# Patient Record
Sex: MALE | Race: WHITE | NOT HISPANIC OR LATINO
[De-identification: names, ages, dates, MRNs, and addresses within clinical notes are randomized per-mention and may not be internally consistent; named-entity substitution may affect disease eponyms.]

---

## 2017-05-09 ENCOUNTER — NON-APPOINTMENT (OUTPATIENT)
Age: 52
End: 2017-05-09

## 2017-05-09 ENCOUNTER — APPOINTMENT (OUTPATIENT)
Dept: CARDIOLOGY | Facility: CLINIC | Age: 52
End: 2017-05-09

## 2017-05-09 VITALS — BODY MASS INDEX: 29.25 KG/M2 | OXYGEN SATURATION: 99 % | HEIGHT: 66 IN | HEART RATE: 78 BPM | WEIGHT: 182 LBS

## 2017-05-09 VITALS — SYSTOLIC BLOOD PRESSURE: 116 MMHG | DIASTOLIC BLOOD PRESSURE: 74 MMHG | RESPIRATION RATE: 15 BRPM

## 2017-06-15 ENCOUNTER — EMERGENCY (EMERGENCY)
Facility: HOSPITAL | Age: 52
LOS: 1 days | Discharge: HOME | End: 2017-06-15
Attending: INTERNAL MEDICINE
Payer: MEDICARE

## 2017-06-15 VITALS
HEART RATE: 103 BPM | RESPIRATION RATE: 16 BRPM | TEMPERATURE: 101 F | OXYGEN SATURATION: 98 % | DIASTOLIC BLOOD PRESSURE: 68 MMHG | SYSTOLIC BLOOD PRESSURE: 129 MMHG

## 2017-06-15 VITALS
SYSTOLIC BLOOD PRESSURE: 116 MMHG | TEMPERATURE: 101 F | DIASTOLIC BLOOD PRESSURE: 71 MMHG | RESPIRATION RATE: 17 BRPM | HEART RATE: 123 BPM | WEIGHT: 158.07 LBS | OXYGEN SATURATION: 96 % | HEIGHT: 66 IN

## 2017-06-15 LAB
ALBUMIN SERPL ELPH-MCNC: 3.5 G/DL — SIGNIFICANT CHANGE UP (ref 3.3–5)
ALP SERPL-CCNC: 62 U/L — SIGNIFICANT CHANGE UP (ref 30–120)
ALT FLD-CCNC: 31 U/L DA — SIGNIFICANT CHANGE UP (ref 10–60)
ANION GAP SERPL CALC-SCNC: 7 MMOL/L — SIGNIFICANT CHANGE UP (ref 5–17)
APPEARANCE UR: CLEAR — SIGNIFICANT CHANGE UP
APTT BLD: 30.1 SEC — SIGNIFICANT CHANGE UP (ref 27.5–37.4)
AST SERPL-CCNC: 31 U/L — SIGNIFICANT CHANGE UP (ref 10–40)
BASOPHILS # BLD AUTO: 0 K/UL — SIGNIFICANT CHANGE UP (ref 0–0.2)
BASOPHILS NFR BLD AUTO: 0.7 % — SIGNIFICANT CHANGE UP (ref 0–2)
BILIRUB SERPL-MCNC: 0.3 MG/DL — SIGNIFICANT CHANGE UP (ref 0.2–1.2)
BILIRUB UR-MCNC: NEGATIVE — SIGNIFICANT CHANGE UP
BUN SERPL-MCNC: 12 MG/DL — SIGNIFICANT CHANGE UP (ref 7–23)
CALCIUM SERPL-MCNC: 9.6 MG/DL — SIGNIFICANT CHANGE UP (ref 8.4–10.5)
CHLORIDE SERPL-SCNC: 98 MMOL/L — SIGNIFICANT CHANGE UP (ref 96–108)
CO2 SERPL-SCNC: 28 MMOL/L — SIGNIFICANT CHANGE UP (ref 22–31)
COLOR SPEC: YELLOW — SIGNIFICANT CHANGE UP
CREAT SERPL-MCNC: 1.19 MG/DL — SIGNIFICANT CHANGE UP (ref 0.5–1.3)
DIFF PNL FLD: ABNORMAL
EOSINOPHIL # BLD AUTO: 0 K/UL — SIGNIFICANT CHANGE UP (ref 0–0.5)
EOSINOPHIL NFR BLD AUTO: 0.1 % — SIGNIFICANT CHANGE UP (ref 0–6)
GLUCOSE SERPL-MCNC: 125 MG/DL — HIGH (ref 70–99)
GLUCOSE UR QL: NEGATIVE MG/DL — SIGNIFICANT CHANGE UP
HCT VFR BLD CALC: 38.7 % — LOW (ref 39–50)
HGB BLD-MCNC: 13 G/DL — SIGNIFICANT CHANGE UP (ref 13–17)
INR BLD: 1.31 RATIO — HIGH (ref 0.88–1.16)
KETONES UR-MCNC: NEGATIVE — SIGNIFICANT CHANGE UP
LACTATE SERPL-SCNC: 1.5 MMOL/L — SIGNIFICANT CHANGE UP (ref 0.7–2)
LEUKOCYTE ESTERASE UR-ACNC: NEGATIVE — SIGNIFICANT CHANGE UP
LIDOCAIN IGE QN: 193 U/L — SIGNIFICANT CHANGE UP (ref 73–393)
LYMPHOCYTES # BLD AUTO: 0.7 K/UL — LOW (ref 1–3.3)
LYMPHOCYTES # BLD AUTO: 9.4 % — LOW (ref 13–44)
MCHC RBC-ENTMCNC: 30.3 PG — SIGNIFICANT CHANGE UP (ref 27–34)
MCHC RBC-ENTMCNC: 33.5 GM/DL — SIGNIFICANT CHANGE UP (ref 32–36)
MCV RBC AUTO: 90.4 FL — SIGNIFICANT CHANGE UP (ref 80–100)
MONOCYTES # BLD AUTO: 1.1 K/UL — HIGH (ref 0–0.9)
MONOCYTES NFR BLD AUTO: 14.4 % — HIGH (ref 2–14)
NEUTROPHILS # BLD AUTO: 5.5 K/UL — SIGNIFICANT CHANGE UP (ref 1.8–7.4)
NEUTROPHILS NFR BLD AUTO: 75.1 % — SIGNIFICANT CHANGE UP (ref 43–77)
NITRITE UR-MCNC: NEGATIVE — SIGNIFICANT CHANGE UP
PH UR: 6 — SIGNIFICANT CHANGE UP (ref 5–8)
PLATELET # BLD AUTO: 184 K/UL — SIGNIFICANT CHANGE UP (ref 150–400)
POTASSIUM SERPL-MCNC: 3.7 MMOL/L — SIGNIFICANT CHANGE UP (ref 3.5–5.3)
POTASSIUM SERPL-SCNC: 3.7 MMOL/L — SIGNIFICANT CHANGE UP (ref 3.5–5.3)
PROT SERPL-MCNC: 8.2 G/DL — SIGNIFICANT CHANGE UP (ref 6–8.3)
PROT UR-MCNC: 30 MG/DL
PROTHROM AB SERPL-ACNC: 14.4 SEC — HIGH (ref 9.8–12.7)
RBC # BLD: 4.28 M/UL — SIGNIFICANT CHANGE UP (ref 4.2–5.8)
RBC # FLD: 11.8 % — SIGNIFICANT CHANGE UP (ref 10.3–14.5)
SODIUM SERPL-SCNC: 133 MMOL/L — LOW (ref 135–145)
SP GR SPEC: 1.01 — SIGNIFICANT CHANGE UP (ref 1.01–1.02)
UROBILINOGEN FLD QL: NEGATIVE MG/DL — SIGNIFICANT CHANGE UP
WBC # BLD: 7.3 K/UL — SIGNIFICANT CHANGE UP (ref 3.8–10.5)
WBC # FLD AUTO: 7.3 K/UL — SIGNIFICANT CHANGE UP (ref 3.8–10.5)

## 2017-06-15 PROCEDURE — 85027 COMPLETE CBC AUTOMATED: CPT

## 2017-06-15 PROCEDURE — 81001 URINALYSIS AUTO W/SCOPE: CPT

## 2017-06-15 PROCEDURE — 87040 BLOOD CULTURE FOR BACTERIA: CPT

## 2017-06-15 PROCEDURE — 99284 EMERGENCY DEPT VISIT MOD MDM: CPT | Mod: 25

## 2017-06-15 PROCEDURE — 80053 COMPREHEN METABOLIC PANEL: CPT

## 2017-06-15 PROCEDURE — 74020: CPT | Mod: 26

## 2017-06-15 PROCEDURE — 99284 EMERGENCY DEPT VISIT MOD MDM: CPT

## 2017-06-15 PROCEDURE — 85730 THROMBOPLASTIN TIME PARTIAL: CPT

## 2017-06-15 PROCEDURE — 85610 PROTHROMBIN TIME: CPT

## 2017-06-15 PROCEDURE — 87086 URINE CULTURE/COLONY COUNT: CPT

## 2017-06-15 PROCEDURE — 83605 ASSAY OF LACTIC ACID: CPT

## 2017-06-15 PROCEDURE — 83690 ASSAY OF LIPASE: CPT

## 2017-06-15 PROCEDURE — 74020: CPT

## 2017-06-15 RX ORDER — SODIUM CHLORIDE 9 MG/ML
1160 INJECTION INTRAMUSCULAR; INTRAVENOUS; SUBCUTANEOUS ONCE
Qty: 0 | Refills: 0 | Status: COMPLETED | OUTPATIENT
Start: 2017-06-15 | End: 2017-06-15

## 2017-06-15 RX ORDER — SODIUM CHLORIDE 9 MG/ML
1000 INJECTION INTRAMUSCULAR; INTRAVENOUS; SUBCUTANEOUS ONCE
Qty: 0 | Refills: 0 | Status: COMPLETED | OUTPATIENT
Start: 2017-06-15 | End: 2017-06-15

## 2017-06-15 RX ORDER — ACETAMINOPHEN 500 MG
975 TABLET ORAL ONCE
Qty: 0 | Refills: 0 | Status: COMPLETED | OUTPATIENT
Start: 2017-06-15 | End: 2017-06-15

## 2017-06-15 RX ADMIN — SODIUM CHLORIDE 2000 MILLILITER(S): 9 INJECTION INTRAMUSCULAR; INTRAVENOUS; SUBCUTANEOUS at 16:38

## 2017-06-15 RX ADMIN — Medication 975 MILLIGRAM(S): at 17:42

## 2017-06-15 RX ADMIN — SODIUM CHLORIDE 2320 MILLILITER(S): 9 INJECTION INTRAMUSCULAR; INTRAVENOUS; SUBCUTANEOUS at 17:19

## 2017-06-15 NOTE — ED PROVIDER NOTE - OBJECTIVE STATEMENT
3 yo wm with psychomotor retardation,hyperlipidemia who isnt eating since yesterday and noted to have fever in er today. 3 yo wm with psychomotor retardation,hyperlipidemia who isnt eating since yesterday and noted to have fever in er today.unable to get more info.had bm in er and is drinking liquids.

## 2017-06-15 NOTE — ED PROVIDER NOTE - MEDICAL DECISION MAKING DETAILS
fever in er...otherwise nl lab and xr results...no overt source of infection...ate and drank in er without vomiting.

## 2017-06-15 NOTE — ED ADULT NURSE NOTE - OBJECTIVE STATEMENT
Pt presents with aide from [place] [facility] disabilities. Aide states Pt has had decreased appetite and fever. Alert Anxious Nonverbal skin hot and dry to touch color good. Lungs clear. Abdomen distended non tender Positive bowel sounds noted

## 2017-06-15 NOTE — ED ADULT NURSE NOTE - PMH
Anxiety    Autism    Bipolar disorder    Constipation    Hyperlipidemia    Psychomotor retardation    Schizophrenia

## 2017-06-16 LAB
CULTURE RESULTS: NO GROWTH — SIGNIFICANT CHANGE UP
SPECIMEN SOURCE: SIGNIFICANT CHANGE UP

## 2017-06-20 LAB
CULTURE RESULTS: SIGNIFICANT CHANGE UP
CULTURE RESULTS: SIGNIFICANT CHANGE UP
SPECIMEN SOURCE: SIGNIFICANT CHANGE UP
SPECIMEN SOURCE: SIGNIFICANT CHANGE UP

## 2017-08-15 ENCOUNTER — OUTPATIENT (OUTPATIENT)
Dept: OUTPATIENT SERVICES | Facility: HOSPITAL | Age: 52
LOS: 1 days | End: 2017-08-15

## 2017-08-17 DIAGNOSIS — Z01.20 ENCOUNTER FOR DENTAL EXAMINATION AND CLEANING WITHOUT ABNORMAL FINDINGS: ICD-10-CM

## 2018-02-20 ENCOUNTER — OUTPATIENT (OUTPATIENT)
Dept: OUTPATIENT SERVICES | Facility: HOSPITAL | Age: 53
LOS: 1 days | End: 2018-02-20

## 2018-02-26 DIAGNOSIS — Z01.20 ENCOUNTER FOR DENTAL EXAMINATION AND CLEANING WITHOUT ABNORMAL FINDINGS: ICD-10-CM

## 2018-05-22 ENCOUNTER — NON-APPOINTMENT (OUTPATIENT)
Age: 53
End: 2018-05-22

## 2018-05-22 ENCOUNTER — APPOINTMENT (OUTPATIENT)
Dept: CARDIOLOGY | Facility: CLINIC | Age: 53
End: 2018-05-22
Payer: MEDICARE

## 2018-05-22 VITALS — OXYGEN SATURATION: 98 % | HEART RATE: 77 BPM

## 2018-05-22 VITALS — WEIGHT: 175 LBS | HEIGHT: 66 IN | BODY MASS INDEX: 28.12 KG/M2

## 2018-05-22 PROCEDURE — 93000 ELECTROCARDIOGRAM COMPLETE: CPT

## 2018-05-22 PROCEDURE — 99214 OFFICE O/P EST MOD 30 MIN: CPT | Mod: 25

## 2018-05-22 RX ORDER — PSYLLIUM SEED
PACKET (EA) ORAL
Refills: 0 | Status: ACTIVE | COMMUNITY

## 2018-05-22 RX ORDER — ASPIRIN 81 MG
600-400 TABLET, DELAYED RELEASE (ENTERIC COATED) ORAL TWICE DAILY
Refills: 0 | Status: ACTIVE | COMMUNITY

## 2018-08-28 ENCOUNTER — OUTPATIENT (OUTPATIENT)
Dept: OUTPATIENT SERVICES | Facility: HOSPITAL | Age: 53
LOS: 1 days | End: 2018-08-28

## 2018-09-06 DIAGNOSIS — Z01.20 ENCOUNTER FOR DENTAL EXAMINATION AND CLEANING WITHOUT ABNORMAL FINDINGS: ICD-10-CM

## 2018-09-06 PROBLEM — F45.9 SOMATOFORM DISORDER, UNSPECIFIED: Chronic | Status: ACTIVE | Noted: 2017-06-15

## 2018-09-06 PROBLEM — F41.9 ANXIETY DISORDER, UNSPECIFIED: Chronic | Status: ACTIVE | Noted: 2017-06-15

## 2018-09-06 PROBLEM — F84.0 AUTISTIC DISORDER: Chronic | Status: ACTIVE | Noted: 2017-06-15

## 2018-09-06 PROBLEM — F20.9 SCHIZOPHRENIA, UNSPECIFIED: Chronic | Status: ACTIVE | Noted: 2017-06-15

## 2018-09-06 PROBLEM — E78.5 HYPERLIPIDEMIA, UNSPECIFIED: Chronic | Status: ACTIVE | Noted: 2017-06-15

## 2018-09-06 PROBLEM — K59.00 CONSTIPATION, UNSPECIFIED: Chronic | Status: ACTIVE | Noted: 2017-06-15

## 2019-03-13 ENCOUNTER — OUTPATIENT (OUTPATIENT)
Dept: OUTPATIENT SERVICES | Facility: HOSPITAL | Age: 54
LOS: 1 days | End: 2019-03-13
Payer: MEDICARE

## 2019-03-13 VITALS
OXYGEN SATURATION: 98 % | DIASTOLIC BLOOD PRESSURE: 75 MMHG | HEIGHT: 68 IN | HEART RATE: 90 BPM | SYSTOLIC BLOOD PRESSURE: 113 MMHG | TEMPERATURE: 98 F | WEIGHT: 171.96 LBS | RESPIRATION RATE: 18 BRPM

## 2019-03-13 DIAGNOSIS — Z98.890 OTHER SPECIFIED POSTPROCEDURAL STATES: Chronic | ICD-10-CM

## 2019-03-13 DIAGNOSIS — E78.5 HYPERLIPIDEMIA, UNSPECIFIED: ICD-10-CM

## 2019-03-13 DIAGNOSIS — K02.9 DENTAL CARIES, UNSPECIFIED: ICD-10-CM

## 2019-03-13 DIAGNOSIS — Z87.39 PERSONAL HISTORY OF OTHER DISEASES OF THE MUSCULOSKELETAL SYSTEM AND CONNECTIVE TISSUE: ICD-10-CM

## 2019-03-13 DIAGNOSIS — Z01.818 ENCOUNTER FOR OTHER PREPROCEDURAL EXAMINATION: ICD-10-CM

## 2019-03-13 LAB
ANION GAP SERPL CALC-SCNC: 10 MMOL/L — SIGNIFICANT CHANGE UP (ref 5–17)
BUN SERPL-MCNC: 18 MG/DL — SIGNIFICANT CHANGE UP (ref 7–23)
CALCIUM SERPL-MCNC: 9.7 MG/DL — SIGNIFICANT CHANGE UP (ref 8.4–10.5)
CHLORIDE SERPL-SCNC: 102 MMOL/L — SIGNIFICANT CHANGE UP (ref 96–108)
CO2 SERPL-SCNC: 24 MMOL/L — SIGNIFICANT CHANGE UP (ref 22–31)
CREAT SERPL-MCNC: 1.11 MG/DL — SIGNIFICANT CHANGE UP (ref 0.5–1.3)
GLUCOSE SERPL-MCNC: 96 MG/DL — SIGNIFICANT CHANGE UP (ref 70–99)
HCT VFR BLD CALC: 40.2 % — SIGNIFICANT CHANGE UP (ref 39–50)
HGB BLD-MCNC: 13.5 G/DL — SIGNIFICANT CHANGE UP (ref 13–17)
MCHC RBC-ENTMCNC: 29.8 PG — SIGNIFICANT CHANGE UP (ref 27–34)
MCHC RBC-ENTMCNC: 33.6 GM/DL — SIGNIFICANT CHANGE UP (ref 32–36)
MCV RBC AUTO: 88.7 FL — SIGNIFICANT CHANGE UP (ref 80–100)
PLATELET # BLD AUTO: 272 K/UL — SIGNIFICANT CHANGE UP (ref 150–400)
POTASSIUM SERPL-MCNC: 4.1 MMOL/L — SIGNIFICANT CHANGE UP (ref 3.5–5.3)
POTASSIUM SERPL-SCNC: 4.1 MMOL/L — SIGNIFICANT CHANGE UP (ref 3.5–5.3)
RBC # BLD: 4.53 M/UL — SIGNIFICANT CHANGE UP (ref 4.2–5.8)
RBC # FLD: 13.4 % — SIGNIFICANT CHANGE UP (ref 10.3–14.5)
SODIUM SERPL-SCNC: 136 MMOL/L — SIGNIFICANT CHANGE UP (ref 135–145)
WBC # BLD: 5.87 K/UL — SIGNIFICANT CHANGE UP (ref 3.8–10.5)
WBC # FLD AUTO: 5.87 K/UL — SIGNIFICANT CHANGE UP (ref 3.8–10.5)

## 2019-03-13 PROCEDURE — 71045 X-RAY EXAM CHEST 1 VIEW: CPT

## 2019-03-13 PROCEDURE — G0463: CPT

## 2019-03-13 PROCEDURE — 71045 X-RAY EXAM CHEST 1 VIEW: CPT | Mod: 26

## 2019-03-13 PROCEDURE — 85027 COMPLETE CBC AUTOMATED: CPT

## 2019-03-13 PROCEDURE — 80048 BASIC METABOLIC PNL TOTAL CA: CPT

## 2019-03-13 RX ORDER — DOCUSATE SODIUM 100 MG
0 CAPSULE ORAL
Qty: 0 | Refills: 0 | COMMUNITY

## 2019-03-13 RX ORDER — LIDOCAINE HCL 20 MG/ML
0.2 VIAL (ML) INJECTION ONCE
Qty: 0 | Refills: 0 | Status: DISCONTINUED | OUTPATIENT
Start: 2019-03-20 | End: 2019-04-04

## 2019-03-13 RX ORDER — SODIUM CHLORIDE 9 MG/ML
3 INJECTION INTRAMUSCULAR; INTRAVENOUS; SUBCUTANEOUS EVERY 8 HOURS
Qty: 0 | Refills: 0 | Status: DISCONTINUED | OUTPATIENT
Start: 2019-03-20 | End: 2019-04-04

## 2019-03-13 NOTE — H&P PST ADULT - NSICDXPASTMEDICALHX_GEN_ALL_CORE_FT
PAST MEDICAL HISTORY:  Anxiety     Autism     Constipation     Hyperlipidemia     Incomplete RBBB     Osteoporosis     Psychomotor retardation     Schizophrenia

## 2019-03-13 NOTE — H&P PST ADULT - NSICDXPROBLEM_GEN_ALL_CORE_FT
PROBLEM DIAGNOSES  Problem: Dental caries  Assessment and Plan: multiple extractions and restorations    Problem: Hyperlipidemia  Assessment and Plan: continue statin    Problem: History of osteoporosis  Assessment and Plan: continue med

## 2019-03-13 NOTE — H&P PST ADULT - HISTORY OF PRESENT ILLNESS
This is a  54 y/o male with history of autistic disorder, mental retardation, hyperlipidemia, osteoporosis, c/o dental caries, he presents today for multiple extractions and restorations This is a  54 y/o male with history of autistic disorder, mental retardation, hyperlipidemia, osteoporosis, c/o dental caries, he presents today for multiple extractions and restorations.  ***pt lives in group home, history obtained from MD notes and group home staff,  treatment consent telephone  obtained from brother Paul Garay  [phone]***

## 2019-03-14 PROBLEM — F31.9 BIPOLAR DISORDER, UNSPECIFIED: Chronic | Status: INACTIVE | Noted: 2017-06-15 | Resolved: 2019-03-13

## 2019-03-19 ENCOUNTER — TRANSCRIPTION ENCOUNTER (OUTPATIENT)
Age: 54
End: 2019-03-19

## 2019-03-20 ENCOUNTER — OUTPATIENT (OUTPATIENT)
Dept: OUTPATIENT SERVICES | Facility: HOSPITAL | Age: 54
LOS: 1 days | End: 2019-03-20
Payer: MEDICARE

## 2019-03-20 VITALS
DIASTOLIC BLOOD PRESSURE: 70 MMHG | HEART RATE: 77 BPM | OXYGEN SATURATION: 99 % | SYSTOLIC BLOOD PRESSURE: 112 MMHG | RESPIRATION RATE: 15 BRPM

## 2019-03-20 VITALS
HEART RATE: 90 BPM | OXYGEN SATURATION: 98 % | SYSTOLIC BLOOD PRESSURE: 138 MMHG | TEMPERATURE: 98 F | RESPIRATION RATE: 18 BRPM | WEIGHT: 171.96 LBS | HEIGHT: 68 IN | DIASTOLIC BLOOD PRESSURE: 757 MMHG

## 2019-03-20 DIAGNOSIS — K02.9 DENTAL CARIES, UNSPECIFIED: ICD-10-CM

## 2019-03-20 DIAGNOSIS — Z01.818 ENCOUNTER FOR OTHER PREPROCEDURAL EXAMINATION: ICD-10-CM

## 2019-03-20 PROCEDURE — D1110: CPT

## 2019-03-20 PROCEDURE — D2140: CPT

## 2019-03-20 PROCEDURE — D0180: CPT

## 2019-03-20 PROCEDURE — D2160: CPT

## 2019-03-20 RX ORDER — SODIUM CHLORIDE 9 MG/ML
1000 INJECTION, SOLUTION INTRAVENOUS
Qty: 0 | Refills: 0 | Status: DISCONTINUED | OUTPATIENT
Start: 2019-03-20 | End: 2019-04-04

## 2019-03-20 RX ORDER — ONDANSETRON 8 MG/1
4 TABLET, FILM COATED ORAL ONCE
Qty: 0 | Refills: 0 | Status: DISCONTINUED | OUTPATIENT
Start: 2019-03-20 | End: 2019-04-04

## 2019-03-20 NOTE — ASU DISCHARGE PLAN (ADULT/PEDIATRIC) - COMMENTS
Follow up with [facility] or [facility] in 6 months for periodic oral exam and adult prophylaxis

## 2019-03-20 NOTE — ASU DISCHARGE PLAN (ADULT/PEDIATRIC) - PROCEDURE
comprehensive dental exam under general anesthesia, adult prophylaxis with localized scaling and root planing

## 2019-03-20 NOTE — BRIEF OPERATIVE NOTE - OPERATION/FINDINGS
Permanent dentition, grossly intact with multiple missing teeth. Existing amalgam restorations intact. Generalized moderate to severe chronic periodontitis with gingival recession and supra and subgingival calculus accretions.

## 2019-03-20 NOTE — ASU DISCHARGE PLAN (ADULT/PEDIATRIC) - CALL YOUR DOCTOR IF YOU HAVE ANY OF THE FOLLOWING:
Pain not relieved by Medications/Swelling that gets worse/Bleeding that does not stop/Inability to tolerate liquids or foods/Nausea and vomiting that does not stop/Increased irritability or sluggishness

## 2019-03-20 NOTE — ASU DISCHARGE PLAN (ADULT/PEDIATRIC) - CARE PROVIDER_API CALL
Melvin Johnson (DDS; MPH)  Dental Medicine  [address] [place] [address]  Phone: [phone]  Fax: [phone]  Follow Up Time:

## 2019-06-14 ENCOUNTER — APPOINTMENT (OUTPATIENT)
Dept: CARDIOLOGY | Facility: CLINIC | Age: 54
End: 2019-06-14
Payer: MEDICARE

## 2019-06-14 ENCOUNTER — NON-APPOINTMENT (OUTPATIENT)
Age: 54
End: 2019-06-14

## 2019-06-14 VITALS
WEIGHT: 174 LBS | DIASTOLIC BLOOD PRESSURE: 75 MMHG | BODY MASS INDEX: 27.97 KG/M2 | HEART RATE: 71 BPM | SYSTOLIC BLOOD PRESSURE: 112 MMHG | HEIGHT: 66 IN | OXYGEN SATURATION: 100 %

## 2019-06-14 PROBLEM — M81.0 AGE-RELATED OSTEOPOROSIS WITHOUT CURRENT PATHOLOGICAL FRACTURE: Chronic | Status: ACTIVE | Noted: 2019-03-13

## 2019-06-14 PROBLEM — I45.10 UNSPECIFIED RIGHT BUNDLE-BRANCH BLOCK: Chronic | Status: ACTIVE | Noted: 2019-03-13

## 2019-06-14 PROCEDURE — 99214 OFFICE O/P EST MOD 30 MIN: CPT | Mod: 25

## 2019-06-14 PROCEDURE — 93000 ELECTROCARDIOGRAM COMPLETE: CPT

## 2019-06-14 NOTE — DISCUSSION/SUMMARY
[FreeTextEntry1] : \par \par Abnormal electrocardiogram - incomplete right bundle branch block. Similar to the past.Cardiac point of view stable\par \par Hyperlipidemia - Continue taking Tricor. Recent labs reviewed  Follow up with PCP  for blood works. \par \par  routine followup  in one year or sooner p.r.n.

## 2019-06-14 NOTE — HISTORY OF PRESENT ILLNESS
[FreeTextEntry1] : Mr. Garay 54 year old male mentally retarded  presents for routine annual cardiology followup was to a history of abnormal electrocardiogram.\par \par The patient is nonverbal.\par \par  As per the aide who accompanies  him,states that  he has  not  demonstrated  symptoms of shortness of breath or chest pain. There is no evidence of edema. He has had no syncope.\par \par Recent blood works done at [facility]. cholesterol levels wnl.\par Blood pressure  today 112/75\par \par \par \par

## 2019-06-14 NOTE — REVIEW OF SYSTEMS
[see HPI] : see HPI [Anxiety] : anxiety [Negative] : Cardiovascular [Eyeglasses] : not currently wearing eyeglasses [Abdominal Pain] : no abdominal pain

## 2020-06-18 ENCOUNTER — APPOINTMENT (OUTPATIENT)
Dept: CARDIOLOGY | Facility: CLINIC | Age: 55
End: 2020-06-18
Payer: MEDICARE

## 2020-06-18 PROCEDURE — 99214 OFFICE O/P EST MOD 30 MIN: CPT

## 2020-06-18 NOTE — PHYSICAL EXAM
[General Appearance - Well Nourished] : well nourished [Well Groomed] : well groomed [General Appearance - Well Developed] : well developed [General Appearance - In No Acute Distress] : no acute distress [Normal Conjunctiva] : the conjunctiva exhibited no abnormalities [Eyelids - No Xanthelasma] : the eyelids demonstrated no xanthelasmas [No Oral Pallor] : no oral pallor [No Oral Cyanosis] : no oral cyanosis [Respiration, Rhythm And Depth] : normal respiratory rhythm and effort [Exaggerated Use Of Accessory Muscles For Inspiration] : no accessory muscle use [Auscultation Breath Sounds / Voice Sounds] : lungs were clear to auscultation bilaterally [Heart Rate And Rhythm] : heart rate and rhythm were normal [Arterial Pulses Normal] : the arterial pulses were normal [Heart Sounds] : normal S1 and S2 [Murmurs] : no murmurs present [Bowel Sounds] : normal bowel sounds [Edema] : no peripheral edema present [Abdomen Soft] : soft [Abdomen Tenderness] : non-tender [Nail Clubbing] : no clubbing of the fingernails [Cyanosis, Localized] : no localized cyanosis [Skin Color & Pigmentation] : normal skin color and pigmentation [] : no rash [No Venous Stasis] : no venous stasis [FreeTextEntry1] : mentally retarded. Anxious. Nonverbal.

## 2020-06-18 NOTE — DISCUSSION/SUMMARY
[FreeTextEntry1] : \par \par Abnormal electrocardiogram - incomplete right bundle branch block. Similar to the past.Cardiac point of view stable\par \par Hyperlipidemia - not well controlled,  Continue taking Tricor. will fish oil 2 tabs daily  Follow up with PCP  for blood works. \par \par  routine followup  in one year or sooner p.r.n.

## 2020-06-18 NOTE — HISTORY OF PRESENT ILLNESS
[FreeTextEntry1] : Mr. Garay 55 year old male mentally retarded  presents for routine annual cardiology followup was to a history of abnormal electrocardiogram.\par \par The patient is nonverbal. restless non cooperative \par \par  As per the aide who accompanies  him,states that  he has  not  demonstrated  symptoms of shortness of breath or chest pain. There is no evidence of edema. He has had no syncope.\par \par Recent blood works done at [facility]. lipid profile 12/19     HDL33 [id number]\par Blood pressure  readings at group home normal range \par \par \par \par

## 2020-06-18 NOTE — REVIEW OF SYSTEMS
[Eyeglasses] : not currently wearing eyeglasses [Abdominal Pain] : no abdominal pain [see HPI] : see HPI [Anxiety] : anxiety [Negative] : Cardiovascular

## 2021-02-09 RX ORDER — OMEGA-3/DHA/EPA/FISH OIL 300-1000MG
1000 CAPSULE ORAL DAILY
Qty: 180 | Refills: 1 | Status: ACTIVE | COMMUNITY
Start: 2020-06-18 | End: 1900-01-01

## 2021-05-03 ENCOUNTER — OUTPATIENT (OUTPATIENT)
Dept: OUTPATIENT SERVICES | Facility: HOSPITAL | Age: 56
LOS: 1 days | End: 2021-05-03
Payer: MEDICARE

## 2021-05-03 DIAGNOSIS — Z11.52 ENCOUNTER FOR SCREENING FOR COVID-19: ICD-10-CM

## 2021-05-03 LAB — SARS-COV-2 RNA SPEC QL NAA+PROBE: SIGNIFICANT CHANGE UP

## 2021-05-03 PROCEDURE — U0003: CPT

## 2021-05-03 PROCEDURE — C9803: CPT

## 2021-05-03 PROCEDURE — U0005: CPT

## 2021-05-06 ENCOUNTER — OUTPATIENT (OUTPATIENT)
Dept: OUTPATIENT SERVICES | Facility: HOSPITAL | Age: 56
LOS: 1 days | End: 2021-05-06
Payer: MEDICARE

## 2021-05-06 VITALS
RESPIRATION RATE: 16 BRPM | HEART RATE: 90 BPM | WEIGHT: 171.96 LBS | DIASTOLIC BLOOD PRESSURE: 83 MMHG | SYSTOLIC BLOOD PRESSURE: 132 MMHG | TEMPERATURE: 98 F | HEIGHT: 66 IN | OXYGEN SATURATION: 97 %

## 2021-05-06 DIAGNOSIS — K02.62 DENTAL CARIES ON SMOOTH SURFACE PENETRATING INTO DENTIN: ICD-10-CM

## 2021-05-06 DIAGNOSIS — Z01.818 ENCOUNTER FOR OTHER PREPROCEDURAL EXAMINATION: ICD-10-CM

## 2021-05-06 DIAGNOSIS — K02.9 DENTAL CARIES, UNSPECIFIED: ICD-10-CM

## 2021-05-06 DIAGNOSIS — F41.9 ANXIETY DISORDER, UNSPECIFIED: ICD-10-CM

## 2021-05-06 DIAGNOSIS — K05.6 PERIODONTAL DISEASE, UNSPECIFIED: ICD-10-CM

## 2021-05-06 DIAGNOSIS — Z92.89 PERSONAL HISTORY OF OTHER MEDICAL TREATMENT: Chronic | ICD-10-CM

## 2021-05-06 LAB
ANION GAP SERPL CALC-SCNC: 14 MMOL/L — SIGNIFICANT CHANGE UP (ref 5–17)
BUN SERPL-MCNC: 16 MG/DL — SIGNIFICANT CHANGE UP (ref 7–23)
CALCIUM SERPL-MCNC: 9.4 MG/DL — SIGNIFICANT CHANGE UP (ref 8.4–10.5)
CHLORIDE SERPL-SCNC: 101 MMOL/L — SIGNIFICANT CHANGE UP (ref 96–108)
CO2 SERPL-SCNC: 23 MMOL/L — SIGNIFICANT CHANGE UP (ref 22–31)
CREAT SERPL-MCNC: 1.02 MG/DL — SIGNIFICANT CHANGE UP (ref 0.5–1.3)
GLUCOSE SERPL-MCNC: 69 MG/DL — LOW (ref 70–99)
HCT VFR BLD CALC: 37.9 % — LOW (ref 39–50)
HGB BLD-MCNC: 13.1 G/DL — SIGNIFICANT CHANGE UP (ref 13–17)
MCHC RBC-ENTMCNC: 30.5 PG — SIGNIFICANT CHANGE UP (ref 27–34)
MCHC RBC-ENTMCNC: 34.6 GM/DL — SIGNIFICANT CHANGE UP (ref 32–36)
MCV RBC AUTO: 88.3 FL — SIGNIFICANT CHANGE UP (ref 80–100)
NRBC # BLD: 0 /100 WBCS — SIGNIFICANT CHANGE UP (ref 0–0)
PLATELET # BLD AUTO: 309 K/UL — SIGNIFICANT CHANGE UP (ref 150–400)
POTASSIUM SERPL-MCNC: 3.6 MMOL/L — SIGNIFICANT CHANGE UP (ref 3.5–5.3)
POTASSIUM SERPL-SCNC: 3.6 MMOL/L — SIGNIFICANT CHANGE UP (ref 3.5–5.3)
RBC # BLD: 4.29 M/UL — SIGNIFICANT CHANGE UP (ref 4.2–5.8)
RBC # FLD: 12.4 % — SIGNIFICANT CHANGE UP (ref 10.3–14.5)
SODIUM SERPL-SCNC: 138 MMOL/L — SIGNIFICANT CHANGE UP (ref 135–145)
WBC # BLD: 6.46 K/UL — SIGNIFICANT CHANGE UP (ref 3.8–10.5)
WBC # FLD AUTO: 6.46 K/UL — SIGNIFICANT CHANGE UP (ref 3.8–10.5)

## 2021-05-06 PROCEDURE — 85027 COMPLETE CBC AUTOMATED: CPT

## 2021-05-06 PROCEDURE — G0463: CPT

## 2021-05-06 PROCEDURE — 80048 BASIC METABOLIC PNL TOTAL CA: CPT

## 2021-05-06 RX ORDER — RISPERIDONE 4 MG/1
1 TABLET ORAL
Qty: 0 | Refills: 0 | DISCHARGE

## 2021-05-06 RX ORDER — ZOLPIDEM TARTRATE 10 MG/1
0 TABLET ORAL
Qty: 0 | Refills: 0 | DISCHARGE

## 2021-05-06 RX ORDER — THIORIDAZINE HCL 10 MG
0 TABLET ORAL
Qty: 0 | Refills: 0 | DISCHARGE

## 2021-05-06 RX ORDER — FENOFIBRATE,MICRONIZED 130 MG
1 CAPSULE ORAL
Qty: 0 | Refills: 0 | DISCHARGE

## 2021-05-06 NOTE — H&P PST ADULT - HISTORY OF PRESENT ILLNESS
This is a  52 y/o male with history of autistic disorder, mental retardation, hyperlipidemia, osteoporosis, c/o dental caries, he presents today for multiple extractions and restorations.  ***pt lives in group home, history obtained from MD notes and group home staff,  treatment consent telephone  obtained from brother Paul Garay  [phone]*** History obtained from chart and group home staff. Guardians are mother Alejandrina Garay , father Paul Garay   57 yo male PMH Intellectual disability, autism, schizophrenia, BPH, hyperlipidemia. Pt. returns to [facility] today for Comprehensive Dental Treatment under anesthesia on 5/19. Pt. is non-verbal, bursts of yelling at times, constantly drinking water and urinating in the bathroom. Pt. does not wear a mask while in the hospital, tested - for COVID-19 on 5/3/21. Aleksey, group home staff, denies pt. had COVID-19 infection in 2020/2021, denies close contact with anyone that has been ill, no fever, cough, dyspnea in past two weeks.     Pt. is scheduled for COVID-19 testing on 5/17 at [facility]    Pt. will have pre-op  M/E at [facility] on 5/11

## 2021-05-06 NOTE — H&P PST ADULT - MUSCULOSKELETAL
negative No joint pain, swelling or deformity; no limitation of movement normal strength detailed exam

## 2021-05-06 NOTE — H&P PST ADULT - NSICDXPASTSURGICALHX_GEN_ALL_CORE_FT
No significant past surgical history PAST SURGICAL HISTORY:  History of dental surgery Comprenhensive Dental Treatment under general anesthesia on  2011, 2019 at [facility]

## 2021-05-06 NOTE — H&P PST ADULT - CARDIOVASCULAR
Regular rate & rhythm, normal S1, S2; no murmurs, gallops or rubs; no S3, S4 knowledge deficit/ineffective breastfeeding negative details… detailed exam

## 2021-05-06 NOTE — H&P PST ADULT - ASSIST WITH
I have reviewed and confirmed nurses' notes for patient's medications, allergies, medical history, and surgical history. standing

## 2021-05-06 NOTE — H&P PST ADULT - NSICDXPASTMEDICALHX_GEN_ALL_CORE_FT
PAST MEDICAL HISTORY:  Anxiety     Autism     Constipation     Hyperlipidemia     Incomplete RBBB     Osteoporosis     Psychomotor retardation     Schizophrenia PAST MEDICAL HISTORY:  Anxiety     Autism     BPH (benign prostatic hyperplasia)     Constipation     Hyperlipidemia     Incomplete RBBB     Osteoporosis     Psychomotor retardation     Schizophrenia

## 2021-05-06 NOTE — H&P PST ADULT - NSICDXPROBLEM_GEN_ALL_CORE_FT
PROBLEM DIAGNOSES  Problem: Dental caries  Assessment and Plan: Comprenhensive Dental Treatment Under Anesthesia on 5/19    Problem: Anxiety  Assessment and Plan: Continue antianxiety medications as indicated with sip of water

## 2021-05-12 PROBLEM — N40.0 BENIGN PROSTATIC HYPERPLASIA WITHOUT LOWER URINARY TRACT SYMPTOMS: Chronic | Status: ACTIVE | Noted: 2021-05-06

## 2021-05-17 ENCOUNTER — OUTPATIENT (OUTPATIENT)
Dept: OUTPATIENT SERVICES | Facility: HOSPITAL | Age: 56
LOS: 1 days | End: 2021-05-17
Payer: MEDICARE

## 2021-05-17 DIAGNOSIS — Z11.52 ENCOUNTER FOR SCREENING FOR COVID-19: ICD-10-CM

## 2021-05-17 DIAGNOSIS — Z92.89 PERSONAL HISTORY OF OTHER MEDICAL TREATMENT: Chronic | ICD-10-CM

## 2021-05-17 LAB — SARS-COV-2 RNA SPEC QL NAA+PROBE: SIGNIFICANT CHANGE UP

## 2021-05-17 PROCEDURE — U0005: CPT

## 2021-05-17 PROCEDURE — U0003: CPT

## 2021-05-17 PROCEDURE — C9803: CPT

## 2021-05-18 ENCOUNTER — TRANSCRIPTION ENCOUNTER (OUTPATIENT)
Age: 56
End: 2021-05-18

## 2021-05-18 NOTE — ASU DISCHARGE PLAN (ADULT/PEDIATRIC) - ASU DC SPECIAL INSTRUCTIONSFT
comprehensive dental treatment under general anesthesia    tylenol prn pain     see Dr Graff in one week at [facility] [id number]    return to program on Friday and resume all medications

## 2021-05-19 ENCOUNTER — OUTPATIENT (OUTPATIENT)
Dept: OUTPATIENT SERVICES | Facility: HOSPITAL | Age: 56
LOS: 1 days | End: 2021-05-19
Payer: MEDICARE

## 2021-05-19 ENCOUNTER — TRANSCRIPTION ENCOUNTER (OUTPATIENT)
Age: 56
End: 2021-05-19

## 2021-05-19 VITALS
HEART RATE: 62 BPM | RESPIRATION RATE: 16 BRPM | DIASTOLIC BLOOD PRESSURE: 74 MMHG | SYSTOLIC BLOOD PRESSURE: 112 MMHG | OXYGEN SATURATION: 96 %

## 2021-05-19 VITALS
DIASTOLIC BLOOD PRESSURE: 78 MMHG | RESPIRATION RATE: 18 BRPM | OXYGEN SATURATION: 100 % | SYSTOLIC BLOOD PRESSURE: 115 MMHG | TEMPERATURE: 97 F | HEIGHT: 66 IN | HEART RATE: 76 BPM | WEIGHT: 171.96 LBS

## 2021-05-19 DIAGNOSIS — Z92.89 PERSONAL HISTORY OF OTHER MEDICAL TREATMENT: Chronic | ICD-10-CM

## 2021-05-19 DIAGNOSIS — K05.6 PERIODONTAL DISEASE, UNSPECIFIED: ICD-10-CM

## 2021-05-19 DIAGNOSIS — K02.62 DENTAL CARIES ON SMOOTH SURFACE PENETRATING INTO DENTIN: ICD-10-CM

## 2021-05-19 PROCEDURE — D1110: CPT

## 2021-05-19 PROCEDURE — D4341: CPT

## 2021-05-19 PROCEDURE — 41820 EXCISION GUM EACH QUADRANT: CPT

## 2021-05-19 RX ORDER — ACETAMINOPHEN 500 MG
1000 TABLET ORAL ONCE
Refills: 0 | Status: DISCONTINUED | OUTPATIENT
Start: 2021-05-19 | End: 2021-05-19

## 2021-05-19 RX ORDER — SODIUM CHLORIDE 9 MG/ML
3 INJECTION INTRAMUSCULAR; INTRAVENOUS; SUBCUTANEOUS EVERY 8 HOURS
Refills: 0 | Status: DISCONTINUED | OUTPATIENT
Start: 2021-05-19 | End: 2021-05-19

## 2021-05-19 NOTE — DISCHARGE NOTE NURSING/CASE MANAGEMENT/SOCIAL WORK - PATIENT PORTAL LINK FT
You can access the FollowMyHealth Patient Portal offered by [facility] by registering at the following website: [email or web address]. By joining [facility]’s FollowMyHealth portal, you will also be able to view your health information using other applications (apps) compatible with our system.

## 2021-06-17 ENCOUNTER — APPOINTMENT (OUTPATIENT)
Dept: CARDIOLOGY | Facility: CLINIC | Age: 56
End: 2021-06-17
Payer: MEDICARE

## 2021-06-17 VITALS
HEIGHT: 66 IN | OXYGEN SATURATION: 100 % | HEART RATE: 74 BPM | SYSTOLIC BLOOD PRESSURE: 116 MMHG | DIASTOLIC BLOOD PRESSURE: 72 MMHG | WEIGHT: 175 LBS | BODY MASS INDEX: 28.12 KG/M2

## 2021-06-17 PROCEDURE — 99214 OFFICE O/P EST MOD 30 MIN: CPT

## 2021-06-17 RX ORDER — QUETIAPINE FUMARATE 50 MG/1
50 TABLET ORAL TWICE DAILY
Refills: 0 | Status: ACTIVE | COMMUNITY

## 2021-06-17 NOTE — HISTORY OF PRESENT ILLNESS
[FreeTextEntry1] : 56 year old male with PMH: mental retardation who presents to the office today for routine annual cardiology follow up due to a history of abnormal EKG.\par \par The patient is accompanied by his aide and is nonverbal, restless and uncooperative.\par \par As per the aide, he states that patient has not demonstrated symptoms of shortness of breath, or chest pain.  There is no evidence of edema and he has had no syncope.  He has a great appetite.\par \par Last labs we have from 2019 demonstrate total cholesterol 168, triglycerides 151, HDL 34, .\par \par Patient would not cooperate for EKG today.

## 2021-06-17 NOTE — DISCUSSION/SUMMARY
[FreeTextEntry1] : Abnormal electrocardiogram - unable to obtain EKG today as patient was too restless.  Has offered no complaints and does not appear to be in any distress.\par \par Hyperlipidemia - Currently taking fish oil and tricor.  No recent labs.  Labs ordered.\par \par Follow up in one year or sooner if needed.

## 2021-06-17 NOTE — REASON FOR VISIT
[Arrhythmia/ECG Abnorrmalities] : arrhythmia/ECG abnormalities [Follow-Up - Clinic] : a clinic follow-up of [FreeTextEntry2] : abnormal EKG.

## 2021-06-17 NOTE — REVIEW OF SYSTEMS
[SOB] : no shortness of breath [Dyspnea on exertion] : not dyspnea during exertion [Chest Discomfort] : no chest discomfort [Lower Ext Edema] : no extremity edema [Orthopnea] : no orthopnea [PND] : no PND [Syncope] : no syncope [Rash] : no rash [de-identified] : Patient with history of  autism

## 2021-06-17 NOTE — PHYSICAL EXAM
[General Appearance - Well Developed] : well developed [Well Groomed] : well groomed [General Appearance - Well Nourished] : well nourished [General Appearance - In No Acute Distress] : no acute distress [Normal Conjunctiva] : the conjunctiva exhibited no abnormalities [Eyelids - No Xanthelasma] : the eyelids demonstrated no xanthelasmas [No Oral Pallor] : no oral pallor [No Oral Cyanosis] : no oral cyanosis [Respiration, Rhythm And Depth] : normal respiratory rhythm and effort [Exaggerated Use Of Accessory Muscles For Inspiration] : no accessory muscle use [Auscultation Breath Sounds / Voice Sounds] : lungs were clear to auscultation bilaterally [Heart Rate And Rhythm] : heart rate and rhythm were normal [Heart Sounds] : normal S1 and S2 [Murmurs] : no murmurs present [Arterial Pulses Normal] : the arterial pulses were normal [Edema] : no peripheral edema present [Bowel Sounds] : normal bowel sounds [Abdomen Soft] : soft [Abdomen Tenderness] : non-tender [Nail Clubbing] : no clubbing of the fingernails [Cyanosis, Localized] : no localized cyanosis [Skin Color & Pigmentation] : normal skin color and pigmentation [] : no rash [No Venous Stasis] : no venous stasis [Well Developed] : well developed [Well Nourished] : well nourished [No Acute Distress] : no acute distress [Normal Venous Pressure] : normal venous pressure [No Carotid Bruit] : no carotid bruit [Normal S1, S2] : normal S1, S2 [No Murmur] : no murmur [Clear Lung Fields] : clear lung fields [Soft] : abdomen soft [Normal Gait] : normal gait [No Rash] : no rash [Moves all extremities] : moves all extremities [de-identified] : alert [FreeTextEntry1] : mentally retarded. Anxious. Nonverbal.

## 2021-10-16 ENCOUNTER — EMERGENCY (EMERGENCY)
Facility: HOSPITAL | Age: 56
LOS: 1 days | Discharge: HOME | End: 2021-10-16
Attending: EMERGENCY MEDICINE | Admitting: EMERGENCY MEDICINE
Payer: MEDICARE

## 2021-10-16 VITALS
TEMPERATURE: 98 F | DIASTOLIC BLOOD PRESSURE: 86 MMHG | RESPIRATION RATE: 18 BRPM | HEIGHT: 66 IN | OXYGEN SATURATION: 96 % | SYSTOLIC BLOOD PRESSURE: 127 MMHG | HEART RATE: 79 BPM | WEIGHT: 179.9 LBS

## 2021-10-16 DIAGNOSIS — Z92.89 PERSONAL HISTORY OF OTHER MEDICAL TREATMENT: Chronic | ICD-10-CM

## 2021-10-16 PROCEDURE — 99282 EMERGENCY DEPT VISIT SF MDM: CPT | Mod: 25

## 2021-10-16 PROCEDURE — 12011 RPR F/E/E/N/L/M 2.5 CM/<: CPT

## 2021-10-16 PROCEDURE — 99283 EMERGENCY DEPT VISIT LOW MDM: CPT | Mod: 25

## 2021-10-16 NOTE — ED PROVIDER NOTE - OBJECTIVE STATEMENT
55 y/o male without reported PMHx BIB aid due to left ear laceration. Aid reports that patient typically hits and scratches his ear. Reports patient has hx of cauliflower ear. Denies large injury, LOC, vomiting, lethargy, fever.

## 2021-10-16 NOTE — ED PROVIDER NOTE - NSFOLLOWUPINSTRUCTIONS_ED_ALL_ED_FT
Follow up with your primary care doctor for wound check in 48 hours. Return for signs of infection, redness, swelling, discharge, fever, etc. Dermabond (skin glue) was applied to the wound. Follow up with your primary care doctor for wound check in 48 hours. Return for signs of infection, redness, swelling, discharge, fever, etc. Dermabond (skin glue) was applied to the wound.      Tissue Adhesive Wound Care      Some cuts, wounds, lacerations, and incisions can be repaired by using tissue adhesive, also called skin glue. It holds the skin together so healing can happen faster. It forms a strong bond on the skin in about 1 minute, and it reaches its full strength in about 2–3 minutes. The adhesive disappears naturally while the wound is healing. It is important to take good care of your wound at home while it heals.      Follow these instructions at home:      Wound care                   •If a bandage (dressing) has been applied, keep it clean and dry.    •Follow instructions from your health care provider about how often to change the dressing.  •Wash your hands with soap and water for at least 20 seconds before and after you change your dressing. If soap and water are not available, use hand .      •Change your dressing as told by your health care provider.      •Leave tissue adhesive in place. It will come off naturally after 7–10 days.        • Do not scratch, rub, or pick at the adhesive.      • Do not place tape over the adhesive. The adhesive could come off the wound when you pull the tape off.      •Protect the wound from further injury until it is healed.    •Check your wound area every day for signs of infection. Check for:  •More redness, swelling, or pain.      •Fluid or blood.      •Warmth.      •Pus or a bad smell.        Bathing   • Do not take baths, swim, or use a hot tub until your health care provider approves. You may only be allowed to take sponge baths. Ask your health care provider if you may take showers.  •You can usually shower after the first 24 hours.      •Cover the dressing with a watertight covering when you take a shower.        • Do not soak the area where there is tissue adhesive.      • Do not use any soaps, petroleum jelly products, or ointments on the wound. Certain ointments can weaken the adhesive.      Eating and drinking   •Eat a diet that includes protein, vitamin A, vitamin C, and other nutrients to help the wound heal. As told by your health care provider, eat a diet that contains food rich in:  •Protein. These include meat, fish, eggs, dairy, beans, and nuts.      •Vitamin A. These include carrots and dark green, leafy vegetables.      •Vitamin C. These include citrus fruits, tomatoes, broccoli, and peppers.        •Drink enough fluid to keep your urine pale yellow.      General instructions     •Protect your wound from the sun when you are outside for the first 6 months, or for as long as told by your health care provider. Apply sunscreen with an SPF of 30 or higher around the scar, or cover it up.      •Take over-the-counter and prescription medicines only as told by your health care provider.      • Do not use any products that contain nicotine or tobacco, such as cigarettes, e-cigarettes, and chewing tobacco. These can delay wound healing. If you need help quitting, ask your health care provider.      •Keep all follow-up visits as told by your health care provider. This is important.        Contact a health care provider if:    •The tissue adhesive becomes soaked with blood or falls off before your wound has healed. The adhesive may need to be replaced.      •You have a fever or chills.        Get help right away if:    •Your wound reopens.    •You have any of these signs of infection:  •More redness, swelling, or pain around the wound.      •A red streak at the area around the wound.      •Fluid or blood coming from the wound.      •Warmth coming from the wound.      •Pus or a bad smell coming from the wound.        •You develop a rash after the adhesive is applied.        Summary    •The adhesive disappears naturally while the wound is healing. It is important to take good care of your wound at home while it heals.      •Always wash your hands for at least 20 seconds with soap and water before and after changing your bandage (dressing).      •To help with healing, eat foods that are rich in protein, vitamin A, vitamin C, and other nutrients.      •Check your wound area every day for signs of infection. Get help right away if you suspect that your wound is infected.      This information is not intended to replace advice given to you by your health care provider. Make sure you discuss any questions you have with your health care provider. Follow up with your primary care doctor for wound check in 48 hours. Return for signs of infection, redness, swelling, discharge, fever, etc. Dermabond (skin glue) was applied to the wound. You may consider follow up with the Plastics doctor provided.       Tissue Adhesive Wound Care      Some cuts, wounds, lacerations, and incisions can be repaired by using tissue adhesive, also called skin glue. It holds the skin together so healing can happen faster. It forms a strong bond on the skin in about 1 minute, and it reaches its full strength in about 2–3 minutes. The adhesive disappears naturally while the wound is healing. It is important to take good care of your wound at home while it heals.      Follow these instructions at home:      Wound care                   •If a bandage (dressing) has been applied, keep it clean and dry.    •Follow instructions from your health care provider about how often to change the dressing.  •Wash your hands with soap and water for at least 20 seconds before and after you change your dressing. If soap and water are not available, use hand .      •Change your dressing as told by your health care provider.      •Leave tissue adhesive in place. It will come off naturally after 7–10 days.        • Do not scratch, rub, or pick at the adhesive.      • Do not place tape over the adhesive. The adhesive could come off the wound when you pull the tape off.      •Protect the wound from further injury until it is healed.    •Check your wound area every day for signs of infection. Check for:  •More redness, swelling, or pain.      •Fluid or blood.      •Warmth.      •Pus or a bad smell.        Bathing   • Do not take baths, swim, or use a hot tub until your health care provider approves. You may only be allowed to take sponge baths. Ask your health care provider if you may take showers.  •You can usually shower after the first 24 hours.      •Cover the dressing with a watertight covering when you take a shower.        • Do not soak the area where there is tissue adhesive.      • Do not use any soaps, petroleum jelly products, or ointments on the wound. Certain ointments can weaken the adhesive.      Eating and drinking   •Eat a diet that includes protein, vitamin A, vitamin C, and other nutrients to help the wound heal. As told by your health care provider, eat a diet that contains food rich in:  •Protein. These include meat, fish, eggs, dairy, beans, and nuts.      •Vitamin A. These include carrots and dark green, leafy vegetables.      •Vitamin C. These include citrus fruits, tomatoes, broccoli, and peppers.        •Drink enough fluid to keep your urine pale yellow.      General instructions     •Protect your wound from the sun when you are outside for the first 6 months, or for as long as told by your health care provider. Apply sunscreen with an SPF of 30 or higher around the scar, or cover it up.      •Take over-the-counter and prescription medicines only as told by your health care provider.      • Do not use any products that contain nicotine or tobacco, such as cigarettes, e-cigarettes, and chewing tobacco. These can delay wound healing. If you need help quitting, ask your health care provider.      •Keep all follow-up visits as told by your health care provider. This is important.        Contact a health care provider if:    •The tissue adhesive becomes soaked with blood or falls off before your wound has healed. The adhesive may need to be replaced.      •You have a fever or chills.        Get help right away if:    •Your wound reopens.    •You have any of these signs of infection:  •More redness, swelling, or pain around the wound.      •A red streak at the area around the wound.      •Fluid or blood coming from the wound.      •Warmth coming from the wound.      •Pus or a bad smell coming from the wound.        •You develop a rash after the adhesive is applied.        Summary    •The adhesive disappears naturally while the wound is healing. It is important to take good care of your wound at home while it heals.      •Always wash your hands for at least 20 seconds with soap and water before and after changing your bandage (dressing).      •To help with healing, eat foods that are rich in protein, vitamin A, vitamin C, and other nutrients.      •Check your wound area every day for signs of infection. Get help right away if you suspect that your wound is infected.      This information is not intended to replace advice given to you by your health care provider. Make sure you discuss any questions you have with your health care provider.

## 2021-10-16 NOTE — ED PROVIDER NOTE - PATIENT PORTAL LINK FT
You can access the FollowMyHealth Patient Portal offered by [facility] by registering at the following website: [email or web address]. By joining [employer]’s FollowMyHealth portal, you will also be able to view your health information using other applications (apps) compatible with our system.

## 2021-10-16 NOTE — ED PROVIDER NOTE - NSICDXPASTSURGICALHX_GEN_ALL_CORE_FT
PAST SURGICAL HISTORY:  History of dental surgery Comprenhensive Dental Treatment under general anesthesia on  2011, 2019 at [facility]

## 2021-10-16 NOTE — ED ADULT NURSE NOTE - OBJECTIVE STATEMENT
Brought in by staff from [facility] for self inflicted open wound to left inner ear venkata 1 hour ago. Minimal bleeding. hx MR, cauliflower ear per medical records. "Scratched ear w/ nails".

## 2021-10-16 NOTE — ED PROVIDER NOTE - PHYSICAL EXAMINATION
Constitutional: Awake, Alert, non-toxic  HEAD: Normocephalic, atraumatic.   EYES: EOM intact, conjunctiva and sclera are clear bilaterally.   ENT: No rhinorrhea, patent, mucous membranes pink/moist, no drooling or stridor. (+) left cauliflower ear, (+) left ear 1 cm non-gaping laceration   NECK: Supple, non-tender  CARDIOVASCULAR: Normal S1, S2; regular rate and rhythm.  RESPIRATORY: Normal respiratory effort; breath sounds CTAB, no wheezes, rhonchi, or rales. Speaking in full sentences. No accessory muscle use.   ABDOMEN: Soft; non-tender, non-distended.   EXTREMITIES: Full passive and active ROM in all extremities; non-tender to palpation; distal pulses palpable and symmetric  SKIN: Warm, dry; good skin turgor, no apparent lesions or rashes, no ecchymosis, brisk capillary refill.  NEURO: Awake and alert

## 2021-10-16 NOTE — ED ADULT TRIAGE NOTE - CHIEF COMPLAINT QUOTE
Brought in by staff from [facility] for self inflicted open wound to left inner ear venkata 1 hour ago. Minimal bleeding. hx MR. "Scratched ear w/ nails".

## 2021-10-16 NOTE — ED PROVIDER NOTE - NSICDXPASTMEDICALHX_GEN_ALL_CORE_FT
PAST MEDICAL HISTORY:  Anxiety     Autism     BPH (benign prostatic hyperplasia)     Constipation     Hyperlipidemia     Incomplete RBBB     Osteoporosis     Psychomotor retardation     Schizophrenia

## 2021-10-16 NOTE — ED ADULT NURSE NOTE - NSICDXPASTSURGICALHX_GEN_ALL_CORE_FT
PAST SURGICAL HISTORY:  History of dental surgery Comprenhensive Dental Treatment under general anesthesia on  2011, 2019 at [facility]

## 2021-10-16 NOTE — ED PROVIDER NOTE - CARE PROVIDER_API CALL
Kote, Mesha ()  Plastic Surgery  [address] [place] [address], [address]  [place] [address]  Phone: [phone]  Fax: [phone]  Follow Up Time: 1-3 Days

## 2021-10-16 NOTE — ED ADULT NURSE NOTE - NSICDXPASTMEDICALHX_GEN_ALL_CORE_FT
PAST MEDICAL HISTORY:  Anxiety     Autism     BPH (benign prostatic hyperplasia)     Cauliflower left ear     Constipation     Hyperlipidemia     Incomplete RBBB     Osteoporosis     Psychomotor retardation     Schizophrenia

## 2021-10-16 NOTE — ED PROVIDER NOTE - CLINICAL SUMMARY MEDICAL DECISION MAKING FREE TEXT BOX
BIB aid due to left ear laceration. Aid reports that patient typically hits and scratches his ear. Reports patient has hx of cauliflower ear. Denies large injury, LOC, vomiting, lethargy, fever. plan includes ear lac repair with dermabond

## 2021-10-16 NOTE — ED PROVIDER NOTE - PROGRESS NOTE DETAILS
Jaz NIETO for attending Dr. Jessica: 57 y/o M w/ severe MR w/ self inflicted L ear injury. Pt is known to have self injurious behavior and hit his ears. Has a history of cauliflower ear prior to this. Tonight was noticed to have cut L ear. No known other injury. On exam, note pt is WD, WN, in NAD; L ear: 1 cm superficial laceration to antihelix, A/P. Will clean and Dermabond wound to follow up outpt for wound follow up. lac repaired with dermabond. Approximated well.

## 2021-11-03 PROBLEM — M95.12 CAULIFLOWER EAR, LEFT EAR: Chronic | Status: ACTIVE | Noted: 2021-10-16

## 2021-12-08 NOTE — PHYSICAL EXAM
[Well Nourished] : well nourished [Normal Conjunctiva] : normal conjunctiva [Normal Venous Pressure] : normal venous pressure [Normal] : clear lung fields, good air entry, no respiratory distress [Soft] : abdomen soft [Non Tender] : non-tender [Normal Gait] : normal gait [No Edema] : no edema [No Rash] : no rash [Moves all extremities] : moves all extremities [Cognitive Impairment] : cognitive impairment

## 2021-12-09 ENCOUNTER — APPOINTMENT (OUTPATIENT)
Dept: CARDIOLOGY | Facility: CLINIC | Age: 56
End: 2021-12-09
Payer: MEDICARE

## 2021-12-09 ENCOUNTER — NON-APPOINTMENT (OUTPATIENT)
Age: 56
End: 2021-12-09

## 2021-12-09 VITALS — DIASTOLIC BLOOD PRESSURE: 81 MMHG | SYSTOLIC BLOOD PRESSURE: 116 MMHG

## 2021-12-09 VITALS — WEIGHT: 172 LBS | BODY MASS INDEX: 27.64 KG/M2 | OXYGEN SATURATION: 99 % | HEART RATE: 119 BPM | HEIGHT: 66 IN

## 2021-12-09 VITALS — SYSTOLIC BLOOD PRESSURE: 116 MMHG | DIASTOLIC BLOOD PRESSURE: 81 MMHG

## 2021-12-09 DIAGNOSIS — Z00.00 ENCOUNTER FOR GENERAL ADULT MEDICAL EXAMINATION W/OUT ABNORMAL FINDINGS: ICD-10-CM

## 2021-12-09 PROCEDURE — 93000 ELECTROCARDIOGRAM COMPLETE: CPT

## 2021-12-09 PROCEDURE — 93306 TTE W/DOPPLER COMPLETE: CPT

## 2021-12-09 PROCEDURE — 99214 OFFICE O/P EST MOD 30 MIN: CPT

## 2021-12-09 NOTE — HISTORY OF PRESENT ILLNESS
[FreeTextEntry1] : 56 year old male with PMH: mental retardation who presents to the office today for routine annual cardiology by formal caregiver , non verbal , no complain \par \par As per the aide, he states that patient has not demonstrated symptoms of shortness of breath, or chest pain.  There is no evidence of edema and he has had no syncope.  He has a great appetite.\par \par Last labs we have from june 2021  demonstrate total cholesterol 160, triglycerides 104, HDL 36, .\par \par

## 2021-12-09 NOTE — DISCUSSION/SUMMARY
[FreeTextEntry1] : Abnormal electrocardiogram - stable , no recent echo , wiill obtain echo to assess ventricular function \par \par \par Hyperlipidemia - Currently taking fish oil and tricor. improved TG , still low HDL ,\par \par Follow up in one year or sooner if needed.

## 2022-06-07 NOTE — ED ADULT NURSE NOTE - NS ED NURSE LEVEL OF CONSCIOUSNESS ORIENTATION
Thank you for choosing the [facility] [place], Levi Joyner MD, and our team as your Neuro Surgery Specialists.  We actively use feedback to constantly improve and deliver the best care possible. To provide the best experience we are collecting feedback from you on how we performed.  You may receive a survey in the mail to evaluate how we did. Please take a moment and share your thoughts.      If for any reason you feel that we did not meet your expectations or you want to share a positive experience, please give us a call. Your feedback helps us know how we are doing and what we can be doing better.    If your provider has ordered imaging for you to complete please call our pre-service department at [phone] to schedule.    Office hours: 8:00 am to 4:30 pm, Monday - Friday  Phone: [phone]    
Recognizes caregiver/Nonverbal

## 2022-12-29 ENCOUNTER — APPOINTMENT (OUTPATIENT)
Dept: CARDIOLOGY | Facility: CLINIC | Age: 57
End: 2022-12-29
Payer: MEDICARE

## 2022-12-29 VITALS
DIASTOLIC BLOOD PRESSURE: 74 MMHG | SYSTOLIC BLOOD PRESSURE: 134 MMHG | HEART RATE: 99 BPM | HEIGHT: 66 IN | OXYGEN SATURATION: 97 %

## 2022-12-29 VITALS
DIASTOLIC BLOOD PRESSURE: 70 MMHG | RESPIRATION RATE: 15 BRPM | HEART RATE: 90 BPM | OXYGEN SATURATION: 97 % | HEIGHT: 66 IN | SYSTOLIC BLOOD PRESSURE: 130 MMHG | WEIGHT: 172 LBS | BODY MASS INDEX: 27.64 KG/M2

## 2022-12-29 PROCEDURE — 99213 OFFICE O/P EST LOW 20 MIN: CPT

## 2022-12-29 RX ORDER — FENOFIBRATE 48 MG/1
48 TABLET ORAL DAILY
Qty: 30 | Refills: 1 | Status: ACTIVE | COMMUNITY

## 2022-12-29 NOTE — CARDIOLOGY SUMMARY
[de-identified] : sinus rhythm  ICRBBB  ( no change from prior ) [de-identified] : 12/9/21   EF 50-55%  mild DD ,  trace MR

## 2022-12-29 NOTE — HISTORY OF PRESENT ILLNESS
[FreeTextEntry1] : 57 year old male with PMH: mental retardation who presents to the office today for routine annual cardiology by formal caregiver , non verbal , no complain  , not cooperative for ekg , \par \par As per the caregiver states that patient has not demonstrated symptoms of shortness of breath, or chest pain.  There is no evidence of edema and he has had no syncope.  He has a great appetite.\par \par Last labs we have from   demonstrate total cholesterol 160, triglycerides 93 , HDL 36, LDL 80 . his blood pressure in normal range \par \par \par His echo Dece 2021  Low normal EF mild DD , \par

## 2022-12-29 NOTE — DISCUSSION/SUMMARY
[FreeTextEntry1] : Abnormal electrocardiogram - stable , borderline EF on previous echo ,  , wiill obtain echo for follow up ventricular function as patient has borderline EF \par \par \par Hyperlipidemia - Currently taking fish oil and tricor. improved TG , still low HDL ,\par \par Follow up in one year or sooner if needed.

## 2023-01-26 ENCOUNTER — EMERGENCY (EMERGENCY)
Facility: HOSPITAL | Age: 58
LOS: 1 days | Discharge: HOME | End: 2023-01-26
Attending: EMERGENCY MEDICINE | Admitting: EMERGENCY MEDICINE
Payer: MEDICARE

## 2023-01-26 VITALS
WEIGHT: 177.91 LBS | HEIGHT: 68 IN | OXYGEN SATURATION: 97 % | TEMPERATURE: 98 F | SYSTOLIC BLOOD PRESSURE: 132 MMHG | RESPIRATION RATE: 19 BRPM | HEART RATE: 100 BPM | DIASTOLIC BLOOD PRESSURE: 70 MMHG

## 2023-01-26 VITALS
RESPIRATION RATE: 19 BRPM | OXYGEN SATURATION: 96 % | SYSTOLIC BLOOD PRESSURE: 134 MMHG | HEART RATE: 92 BPM | DIASTOLIC BLOOD PRESSURE: 82 MMHG

## 2023-01-26 DIAGNOSIS — Z92.89 PERSONAL HISTORY OF OTHER MEDICAL TREATMENT: Chronic | ICD-10-CM

## 2023-01-26 PROCEDURE — 12001 RPR S/N/AX/GEN/TRNK 2.5CM/<: CPT

## 2023-01-26 PROCEDURE — 99284 EMERGENCY DEPT VISIT MOD MDM: CPT | Mod: FS,25

## 2023-01-26 PROCEDURE — 99283 EMERGENCY DEPT VISIT LOW MDM: CPT | Mod: 25

## 2023-01-26 RX ORDER — RISPERIDONE 4 MG/1
1 TABLET ORAL
Qty: 0 | Refills: 0 | DISCHARGE

## 2023-01-26 NOTE — ED PROVIDER NOTE - PATIENT PORTAL LINK FT
You can access the FollowMyHealth Patient Portal offered by [facility] by registering at the following website: [email or web address]. By joining [employer]’s FollowMyHealth portal, you will also be able to view your health information using other applications (apps) compatible with our system.

## 2023-01-26 NOTE — ED PROVIDER NOTE - OBJECTIVE STATEMENT
Patient is a 57-year-old male with past medical anxiety autism BPH constipation hyperlipidemia osteoporosis history of hernia brought in by staff from group home for head injury.  Patient was bending down and stood up and hit his head on a metal box attached LOC.  Tdap up-to-date 2015 incident occurred a few hours ago no LOC no vomiting patient's been acting baseline.

## 2023-01-26 NOTE — ED ADULT NURSE NOTE - INTERVENTIONS DEFINITIONS
Room bathroom lighting operational/Non-slip footwear when patient is off stretcher/Physically safe environment: no spills, clutter or unnecessary equipment/Stretcher in lowest position, wheels locked, appropriate side rails in place/Monitor gait and stability/Review medications for side effects contributing to fall risk/Reinforce activity limits and safety measures with patient and family

## 2023-01-26 NOTE — ED PROVIDER NOTE - NS ED ATTENDING STATEMENT MOD
This was a shared visit with the BHARGAVI. I reviewed and verified the documentation and independently performed the documented:

## 2023-01-26 NOTE — ED PROVIDER NOTE - NSICDXPASTSURGICALHX_GEN_ALL_CORE_FT
PAST SURGICAL HISTORY:  History of dental surgery Comprenhensive Dental Treatment under general anesthesia on  2011, 2019 at [facility]

## 2023-01-26 NOTE — ED PROVIDER NOTE - NSFOLLOWUPINSTRUCTIONS_ED_ALL_ED_FT
Follow up with pcp  do not wet staples for 24 hours  7 day staple removal                                                                                       Head Injury, Adult       There are many types of head injuries. They can be as minor as a small bump. Some head injuries can be worse. Worse injuries include:  •A strong hit to the head that shakes the brain back and forth, causing damage (concussion).      •A bruise (contusion) of the brain. This means there is bleeding in the brain that can cause swelling.      •A cracked skull (skull fracture).      •Bleeding in the brain that gathers, gets thick (makes a clot), and forms a bump (hematoma).      Most problems from a head injury come in the first 24 hours. However, you may still have side effects up to 7–10 days after your injury. It is important to watch your condition for any changes. You may need to be watched in the emergency department or urgent care, or you may need to stay in the hospital.      What are the causes?    There are many possible causes of a head injury. A serious head injury may be caused by:  •A car accident.      •Bicycle or motorcycle accidents.      •Sports injuries.      •Falls.      •Being hit by an object.        What are the signs or symptoms?    Symptoms of a head injury include a bruise, bump, or bleeding where the injury happened. Other physical symptoms may include:  •Headache.      •Feeling like you may vomit (nauseous) or vomiting.      •Dizziness.      •Blurred or double vision.      •Being uncomfortable around bright lights or loud noises.      •Shaking movements that you cannot control (seizures).      •Feeling tired.      •Trouble being woken up.      •Fainting or loss of consciousness.      Mental or emotional symptoms may include:  •Feeling grumpy or cranky.      •Confusion and memory problems.      •Having trouble paying attention or concentrating.      •Changes in eating or sleeping habits.      •Feeling worried or nervous (anxious).      •Feeling sad (depressed).        How is this treated?    Treatment for this condition depends on how severe the injury is and the type of injury you have. The main goal is to prevent problems and to allow the brain time to heal.    Mild head injury     If you have a mild head injury, you may be sent home, and treatment may include:  •Being watched. A responsible adult should stay with you for 24 hours after your injury and check on you often.      •Physical rest.      •Brain rest.      •Pain medicines.      Severe head injury    If you have a severe head injury, treatment may include:  •Being watched closely. This includes staying in the hospital.    •Medicines to:  •Help with pain.      •Prevent seizures.      •Help with brain swelling.        •Protecting your airway and using a machine that helps you breathe (ventilator).      •Treatments to watch for and manage swelling inside the brain.    •Brain surgery. This may be needed to:  •Remove a collection of blood or blood clots.      •Stop the bleeding.      •Remove a part of the skull. This allows room for the brain to swell.          Follow these instructions at home:    Activity     •Rest.      •Avoid activities that are hard or tiring.      •Make sure you get enough sleep.    •Let your brain rest. Do this by limiting activities that need a lot of thought or attention, such as:  •Watching TV.      •Playing memory games and puzzles.      •Job-related work or homework.      •Working on the computer, social media, and texting.        •Avoid activities that could cause another head injury until your doctor says it is okay. This includes playing sports. Having another head injury, especially before the first one has healed, can be dangerous.      •Ask your doctor when it is safe for you to go back to your normal activities, such as work or school. Ask your doctor for a step-by-step plan for slowly going back to your normal activities.      •Ask your doctor when you can drive, ride a bicycle, or use heavy machinery. Do not do these activities if you are dizzy.        Lifestyle      • Do not drink alcohol until your doctor says it is okay.      • Do not use drugs.      •If it is harder than usual to remember things, write them down.      •If you are easily distracted, try to do one thing at a time.      •Talk with family members or close friends when making important decisions.      •Tell your friends, family, a trusted co-worker, and  about your injury, symptoms, and limits (restrictions). Have them watch for any problems that are new or getting worse.      General instructions     •Take over-the-counter and prescription medicines only as told by your doctor.      •Have someone stay with you for 24 hours after your head injury. This person should watch you for any changes in your symptoms and be ready to get help.      •Keep all follow-up visits as told by your doctor. This is important.      How is this prevented?     •Work on your balance and strength. This can help you avoid falls.      •Wear a seat belt when you are in a moving vehicle.    •Wear a helmet when you:  •Ride a bicycle.      •Ski.      •Do any other sport or activity that has a risk of injury.      •If you drink alcohol:•Limit how much you use to:  •0–1 drink a day for nonpregnant women.      •0–2 drinks a day for men.        •Be aware of how much alcohol is in your drink. In the [place] one drink equals one 12 oz bottle of beer (355 mL), one 5 oz glass of wine (148 mL), or one 1½ oz glass of hard liquor (44 mL).      •Make your home safer by:  •Getting rid of clutter from the floors and stairs. This includes things that can make you trip.      •Using grab bars in bathrooms and handrails by stairs.      •Placing non-slip mats on floors and in bathtubs.      •Putting more light in dim areas.          Where to find more information    •Centers for Disease Control and Prevention: www.cdc.gov        Get help right away if:  •You have:  •A very bad headache that is not helped by medicine.      •Trouble walking or weakness in your arms and legs.      •Clear or bloody fluid coming from your nose or ears.      •Changes in how you see (vision).      •A seizure.      •More confusion or more grumpy moods.        •Your symptoms get worse.      •You are sleepier than normal and have trouble staying awake.      •You lose your balance.      •The black centers of your eyes (pupils) change in size.      •Your speech is slurred.      •Your dizziness gets worse.      •You vomit.      These symptoms may be an emergency. Do not wait to see if the symptoms will go away. Get medical help right away. Call your local emergency services (911 in the [place] Do not drive yourself to the hospital.       Summary    •Head injuries can be as minor as a small bump. Some head injuries can be worse.      •Treatment for this condition depends on how severe the injury is and the type of injury you have.      •Have someone stay with you for 24 hours after your head injury.      •Ask your doctor when it is safe for you to go back to your normal activities, such as work or school.      •To prevent a head injury, wear a seat belt in a car, wear a helmet when you use a bicycle, limit your alcohol use, and make your home safer.      This information is not intended to replace advice given to you by your health care provider. Make sure you discuss any questions you have with your health care provider.      Document Revised: 10/30/2020 Document Reviewed: 10/30/2020    Elsevier Patient Education © 2022 Elsevier Inc.

## 2023-01-26 NOTE — ED PROVIDER NOTE - CONSTITUTIONAL, MLM
normal... Well appearing, awake, alert, oriented to person, place, time/situation and in no apparent distress 2 cm scalp laceration no bleeding

## 2023-01-26 NOTE — ED PROVIDER NOTE - CPE EDP NEURO NORM
Physical Therapy Daily Treatment   Visit Count: 2 of 12  Plan of Care Dates: Initial: 11/6/2017 Through: 1/29/2018     Insurance Information: MEDICARE - G codes and KX apply      Next Referring Provider Visit: 04/24/18, sooner if needed     Referred by: Chaparrita Ruano MD  Medical Diagnosis (from order):  Tear of right rotator cuff, unspecified tear extent [M75.101]   Treatment Diagnosis: Shoulder Symptoms with Pain and Impaired Motor Function/Muscle Performance  Insurance: 1. MEDICARE  2. 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, no residual symptoms, no diabetes      Past Medical History:   Diagnosis Date   • Anxiety     • Cerebral infarction (CMS/HCC)     • Essential (primary) hypertension     • Other and unspecified hyperlipidemia       hyperlipidemia   • Sinusitis, chronic     • Urinary tract infection           History - past surgical         Past Surgical History:   Procedure Laterality Date   • APPENDECTOMY       • COLONOSCOPY DIAGNOSTIC   02/01/2013   • HERNIA REPAIR       • SHOULDER ARTHROSCOPY W/ ROTATOR CUFF REPAIR         Affi 5yr recall, polyp   • TONSILLECTOMY                  Current Outpatient Prescriptions   Medication Sig   • MELATONIN PO     • LORazepam (ATIVAN) 1 MG tablet Take 1 tablet by mouth nightly as needed for Anxiety.   • levothyroxine (SYNTHROID) 75 MCG tablet Take 1 tablet by mouth daily.   • ciprofloxacin (CIPRO) 500 MG tablet Take 3 tablets (1500mg) one hour prior to dental procedure   • MAGNESIUM OXIDE PO     • rosuvastatin (CRESTOR) 10 MG tablet Take 0.5 tablets by mouth daily.   • lisinopril (PRINIVIL,ZESTRIL) 40 MG tablet Take 1 tablet by mouth daily.    • Vitamin D, Cholecalciferol, 1000 UNITS Tab 4,000   • POTASSIUM CHLORIDE PO Take 595 mg by mouth daily. otc dos   • Tetrahydrozoline HCl (VISINE OP) Apply  to eye.   • EPINEPHrine (EPIPEN) 0.3 MG/0.3ML MANJINDER auto-injector Inject as needed and dial 911 for any shortness of breathe or trouble swallowing      No current facility-administered medications for this encounter.      SUBJECTIVE   Feels 85% better. Almost ready to self manage.    Current Pain: 0/10.    Functional Change: Improved mobility above shoulder level with less pain.    OBJECTIVE   AROM: (shoulder)  Grossly WFL all planes with mild discomfort mid range scaption.     Treatment   Therapeutic Exercise; to develop strength, endurance, ROM or flexibility   Reviewed HEP  Progressed to ER/IR with light Tband resist at approx.80 deg scaption  Pectoral stretch in doorway at V position and shoulder/elbow 90/90 30\" x 3, 3x/d  Biceps stretch with fingers interlaced and shoulder extension as well as option for use of door handle or door frame 30\" x 3, 3x/d    Current Home Program (not performed this date except as noted above):   Green theraband IR and ER at 80 deg scaption  Scaption to 90 deg no hiking  Biceps curls- slow eccentric return   Pectoral stretch in doorway at V position and shoulder/elbow 90/90 30\" x 3, 3x/d  Biceps stretch with fingers interlaced and shoulder extension as well as option for use of door handle or door frame 30\" x 3, 3x/d    ASSESSMENT   Pt with resolving shoulder pathology involving bicpes, subdeltoid bursa with full nearly pain free ROM and progressing strength and function.    Pain after treatment: NT/10  Result of above outlined education: Verbalizes understanding    Goals:       1. Patient independent with modified and progressed home exercise program.  2. Patient will decrease involved shoulder pain to 2/10  to aid in tolerating repetitive overhead/upper extremity activity.   3. Patient will increase involved shoulder strength  to 4+/5 to aid in completion of household tasks for independent living.        PLAN   Pt to cancel remaining appointments if symptoms continue to improve.   If return follow up then review HEP-  STM and/or US to most painful region if needed - prox biceps, subdeltoid bursa, kinesiotape??    THERAPY DAILY BILLING   Primary Insurance:  MEDICARE  Secondary Insurance: ANTHEM/BCBS    Evaluation Procedures:  No evaluation codes were used on this date of service    Timed Procedures:  Therapeutic Exercise, 30 minutes    Untimed Procedures:  No untimed codes were used on this date of service    Total Treatment Time: 30 minutes    G-Code:  G-Code Score ABN form  reporting not required this treatment session  Modifier based on outcome measure(s)/functional testing/clinical judgement as listed above    The referring provider's electronic or written signature on the evaluation authorizes the therapy plan of care and certifies the need for these services, furnished under this plan of care while under their care.  Physician Signature on file.      normal...

## 2023-01-26 NOTE — ED ADULT NURSE NOTE - NSICDXPASTSURGICALHX_GEN_ALL_CORE_FT
PAST SURGICAL HISTORY:  History of dental surgery Comprenhensive Dental Treatment under general anesthesia on  2011, 2019 at [facility]

## 2023-01-26 NOTE — ED ADULT NURSE REASSESSMENT NOTE - NS ED NURSE REASSESS COMMENT FT1
pt re evaluated by md and to be d'c/d  pt discharged stable and ambulatory in nad at present d/c instruction reinforced and pt verbalized understanding vital signs as charted staff  explained how to care for wound and advised on signs of infection which include redness, swelling ,fever, pus discharge and /or pain and if any symptoms occur to return to emergency room for evaluation and staff verbalized understanding  advised to return to ed in 7 days for staple removal

## 2023-01-26 NOTE — ED PROVIDER NOTE - CLINICAL SUMMARY MEDICAL DECISION MAKING FREE TEXT BOX
Patient brought in by aide from group home for laceration to top of scalp status post accidentally scratching her head on a metal box when he was bending over.  No LOC vomiting or any other injuries.  Patient did not fall to ground.  Per aide tetanus up-to-date, patient acting his usual self.    Patient with mild head injury causing laceration to scalp very low suspicion for intracranial hemorrhage does not need CT based on mechanism or symptoms will repair laceration with staples

## 2023-01-26 NOTE — ED ADULT NURSE NOTE - OBJECTIVE STATEMENT
hit head into object and sustained laceration to top of head mild active bleeding laceration to top of head. fall witnessed and no loc.

## 2023-02-02 ENCOUNTER — EMERGENCY (EMERGENCY)
Facility: HOSPITAL | Age: 58
LOS: 1 days | Discharge: HOME | End: 2023-02-02
Attending: EMERGENCY MEDICINE | Admitting: EMERGENCY MEDICINE
Payer: MEDICARE

## 2023-02-02 VITALS
HEART RATE: 88 BPM | WEIGHT: 164.91 LBS | SYSTOLIC BLOOD PRESSURE: 133 MMHG | HEIGHT: 68 IN | TEMPERATURE: 98 F | DIASTOLIC BLOOD PRESSURE: 83 MMHG | OXYGEN SATURATION: 97 % | RESPIRATION RATE: 16 BRPM

## 2023-02-02 DIAGNOSIS — Z92.89 PERSONAL HISTORY OF OTHER MEDICAL TREATMENT: Chronic | ICD-10-CM

## 2023-02-02 PROCEDURE — L9995: CPT

## 2023-02-02 PROCEDURE — G0463: CPT

## 2023-02-02 NOTE — ED PROVIDER NOTE - NSFOLLOWUPINSTRUCTIONS_ED_ALL_ED_FT
Wound Closure Removal, Care After      The following information offers guidance on how to care for yourself after your stitches (sutures), staples, or adhesive strips have been removed. Your health care provider may also give you more specific instructions. If you have problems or questions, contact your health care provider.      What can I expect after the procedure?    After your sutures or staples have been removed or your adhesive strips have fallen off, it is common to have:  •Some discomfort and swelling in the area.      •Slight redness in the area.        Follow these instructions at home:    If you have a dressing:     •Wash your hands with soap and water for at least 20 seconds before and after you change your bandage (dressing). If soap and water are not available, use hand .      •Change your dressing as told by your health care provider. If your dressing becomes wet or dirty, or develops a bad smell, change it as soon as possible.       •If your dressing sticks to your skin, pour warm, clean water over it until it loosens and can be removed without pulling apart the wound edges. Pat the area dry with a soft, clean towel. Do not rub the wound because that may cause bleeding.        Wound care   Washing hands with soap and water. •Check your wound every day for signs of infection. Check for:  •More redness, swelling, or pain.      •Fluid or blood.       •New warmth, a rash, or hardness at the wound site.      •Pus or a bad smell.        •Wash your hands with soap and water for at least 20 seconds before and after touching your wound. If soap and water are not available, use hand .      •Keep the wound area dry and clean. Clean and pat the wound dry as told by your health care provider.      •Apply cream or ointment only as told by your health care provider.      •If skin glue or adhesive strips were applied after sutures or staples were removed, leave these closures in place until they peel off on their own. If adhesive strip edges start to loosen and curl up, you may trim the loose edges. Do not remove adhesive strips completely unless your health care provider tells you to do that.      •Continue to protect the wound from injury.       • Do not pick at your wound. Picking can cause an infection.      Bathing     • Do not take baths, swim, or use a hot tub until your health care provider approves. Ask your health care provider if you may take showers.    •Follow these steps for showering:  •If you have a dressing, remove it before getting into the shower.      •In the shower, allow soapy water to get on the wound. Avoid scrubbing the wound.      •When you get out of the shower, dry the wound by patting it with a clean towel.      •Reapply a dressing over the wound, if needed.        Scar care     When your wound has completely healed, help decrease the size of your scar by:  •Wearing sunscreen over the scar or covering it with clothing when you are outside. New scars get sunburned easily, which can make scarring worse.      •Gently massaging the scarred area. This can decrease scar thickness.      General instructions    •Take over-the-counter and prescription medicines only as told by your health care provider.       •Keep all follow-up visits. This is important.        Contact a health care provider if:    •You have more redness, swelling, or pain around your wound.      •You have fluid or blood coming from your wound.       •You have new warmth, a rash, or hardness at the wound site.      •You have pus or a bad smell coming from your wound.       •Your wound opens up.        Get help right away if:    •You have a fever or chills.      •You have red streaks coming from your wound.        Summary    •Change your dressing as told by your health care provider. If your dressing becomes wet or dirty, or develops a bad smell, change it as soon as possible.      •Check your wound every day for signs of infection.      •Wash your hands with soap and water for 20 seconds before and after touching your wound.      This information is not intended to replace advice given to you by your health care provider. Make sure you discuss any questions you have with your health care provider.

## 2023-02-02 NOTE — ED PROVIDER NOTE - NSICDXPASTSURGICALHX_GEN_ALL_CORE_FT
PAST SURGICAL HISTORY:  History of dental surgery Comprenhensive Dental Treatment under general anesthesia on  2011, 2019 at [facility]

## 2023-02-02 NOTE — ED PROVIDER NOTE - OBJECTIVE STATEMENT
57-year-old male with history of anxiety, autism brought in by staff member from [facility] for staple removal from scalp today.  States that patient was seen in ED on 1/26 for scalp laceration repair after hitting his head on a metal box.  States that patient is at his baseline mental status.  Denies fever, vomiting or other symptoms.

## 2023-02-02 NOTE — ED PROCEDURE NOTE - ATTENDING APP SHARED VISIT CONTRIBUTION OF CARE
Ady Anand MD: I have personally performed a face to face diagnostic evaluation on this patient.  I have reviewed the PA note and agree with the history, exam, and plan of care, except as noted.  History and Exam by me shows same findings as documented    staples removed

## 2023-02-02 NOTE — ED PROVIDER NOTE - PATIENT PORTAL LINK FT
You can access the FollowMyHealth Patient Portal offered by [facility] by registering at the following website: [email or web address]. By joining [facility]’s FollowMyHealth portal, you will also be able to view your health information using other applications (apps) compatible with our system.

## 2023-02-02 NOTE — ED PROVIDER NOTE - ATTENDING APP SHARED VISIT CONTRIBUTION OF CARE
Ady Anand MD: I have personally performed a face to face diagnostic evaluation on this patient.  I have reviewed the PA note and agree with the history, exam, and plan of care, except as noted.  History and Exam by me shows same findings as documented

## 2023-02-02 NOTE — ED ADULT NURSE NOTE - OBJECTIVE STATEMENT
Brought in from Group home, as per staff, sent here for staple removal in head, 2 staples noted. Area clean and dry.

## 2023-02-05 ENCOUNTER — EMERGENCY (EMERGENCY)
Facility: HOSPITAL | Age: 58
LOS: 1 days | Discharge: HOME | End: 2023-02-05
Attending: EMERGENCY MEDICINE | Admitting: EMERGENCY MEDICINE
Payer: MEDICARE

## 2023-02-05 VITALS
SYSTOLIC BLOOD PRESSURE: 121 MMHG | RESPIRATION RATE: 17 BRPM | WEIGHT: 179.9 LBS | HEART RATE: 96 BPM | HEIGHT: 68 IN | TEMPERATURE: 97 F | OXYGEN SATURATION: 98 % | DIASTOLIC BLOOD PRESSURE: 80 MMHG

## 2023-02-05 DIAGNOSIS — Z92.89 PERSONAL HISTORY OF OTHER MEDICAL TREATMENT: Chronic | ICD-10-CM

## 2023-02-05 LAB
APPEARANCE UR: CLEAR — SIGNIFICANT CHANGE UP
BILIRUB UR-MCNC: NEGATIVE — SIGNIFICANT CHANGE UP
COLOR SPEC: YELLOW — SIGNIFICANT CHANGE UP
DIFF PNL FLD: NEGATIVE — SIGNIFICANT CHANGE UP
GLUCOSE UR QL: NEGATIVE MG/DL — SIGNIFICANT CHANGE UP
KETONES UR-MCNC: NEGATIVE — SIGNIFICANT CHANGE UP
LEUKOCYTE ESTERASE UR-ACNC: NEGATIVE — SIGNIFICANT CHANGE UP
NITRITE UR-MCNC: NEGATIVE — SIGNIFICANT CHANGE UP
PH UR: 7 — SIGNIFICANT CHANGE UP (ref 5–8)
PROT UR-MCNC: NEGATIVE MG/DL — SIGNIFICANT CHANGE UP
RBC CASTS # UR COMP ASSIST: SIGNIFICANT CHANGE UP /HPF (ref 0–4)
SP GR SPEC: 1 — LOW (ref 1.01–1.02)
UROBILINOGEN FLD QL: NEGATIVE MG/DL — SIGNIFICANT CHANGE UP
WBC UR QL: SIGNIFICANT CHANGE UP

## 2023-02-05 PROCEDURE — 87086 URINE CULTURE/COLONY COUNT: CPT

## 2023-02-05 PROCEDURE — 81001 URINALYSIS AUTO W/SCOPE: CPT

## 2023-02-05 PROCEDURE — 74019 RADEX ABDOMEN 2 VIEWS: CPT | Mod: 26

## 2023-02-05 PROCEDURE — 74019 RADEX ABDOMEN 2 VIEWS: CPT

## 2023-02-05 PROCEDURE — 99283 EMERGENCY DEPT VISIT LOW MDM: CPT

## 2023-02-05 PROCEDURE — 99284 EMERGENCY DEPT VISIT MOD MDM: CPT

## 2023-02-05 NOTE — ED PROVIDER NOTE - CARE PROVIDER_API CALL
Katja Grace)  Family Medicine  [address], [address]  [place] [id number]  Phone: [phone]  Fax: [phone]  Follow Up Time:

## 2023-02-05 NOTE — ED PROVIDER NOTE - PRO INTERPRETER NEED 2
Telemedicine: Established Patient   This evaluation was conducted via [employer] using secure and encrypted videoconferencing technology. The patient was in a private location in the state of [place].    The patient's identity was confirmed and verbal consent was obtained for this virtual visit.    Subjective:   CC:   Chief Complaint   Patient presents with   • Annual Exam       Thomas Huber is a 59 y.o. male presenting for evaluation and management of:    Messi says he still having sinus symptoms.  He says when he goes to [place] particularly by the beach his symptoms resolve.  He has had intermittent sinus congestion over the last couple months.  No current purulent drainage, facial swelling/redness, fever.  He says that he did see ENT around Fall and had multiple courses of antibiotics and even steroids and still his symptoms persist.  He says he had a sinus CT scan showing nasal passages narrow and was offered surgery but he says this was too much of a financial strain.  He is not currently using Flonase as he says it gave him nasal irritation and bleeding, he was instructed to angle spray more towards the ears to prevent this.  Sounds like he is using a sinus lavage.  He is agreeable to better treat allergies as he feels this is probably a component of his chronic intermittent symptoms.    Noted blood pressure elevated today, repeat measurement during our visit also similarly elevated, same as comparison to June.  He has not been checking his blood pressure at home he will start doing this regularly.  Indicates he has gained about 15 pounds he will work on weight loss.  Having more alcohol to drink see section below.  He does not think he has excess salt but he is advised stay under 2 g salt daily.  Not using any Sudafed or NSAIDs.  He is compliant with his losartan.  Is still active with shoveling snow today without any cardiopulmonary or strokelike symptoms.    Noted labs 1/20/2020 total cholesterol 178,  triglycerides 51, HDL 66 .  He is advised that his 10-year ASCVD risk is elevated 10.5%.  He is offered statin therapy and he wants to think about this.  He was also offered cardiac CT to better define his coronary anatomy and he says he will think about this. Noted prior stroke hx, on baby asa.    Additional labs from 1/20/2021 showed normal GFR, CBC, TSH 3.910, PSA 0.82.  CMP remarkable for fasting glucose 113, AST of 46.    Regarding the very minimally elevated AST he says he has been drinking a lot more alcohol equivalent to 3-4 beers or very low alcohol content equivalent 3 to 4 days/week.  He has no right upper quadrant pain or GI symptoms of concern.    ROS  Review of systems otherwise negative    No Known Allergies    Current medicines (including changes today)  Current Outpatient Medications   Medication Sig Dispense Refill   • azelastine (ASTELIN) 137 MCG/SPRAY nasal spray Administer 1 Spray into affected nostril(S) 2 times a day. 30 mL 3   • montelukast (SINGULAIR) 10 MG Tab Take 1 Tab by mouth every day. 30 Tab 3   • losartan (COZAAR) 50 MG Tab Take 1.5 Tabs by mouth every day. 45 Tab 1   • levothyroxine (SYNTHROID) 25 MCG Tab TAKE ONE TABLET BY MOUTH IN THE MORNING on an empty stomach 30 Tab 0   • levothyroxine (SYNTHROID) 25 MCG Tab Take 1 Tab by mouth Every morning on an empty stomach. 90 Tab 0   • tadalafil (CIALIS) 10 MG tablet Take 1 Tab by mouth as needed for Erectile Dysfunction. 10 Tab 11   • aspirin 81 MG tablet Take 81 mg by mouth every day.       No current facility-administered medications for this visit.        Patient Active Problem List    Diagnosis Date Noted   • Chronic pansinusitis 01/27/2021   • Hypertrophy of nasal turbinates 01/27/2021   • Seasonal allergies 01/27/2021   • Impaired fasting blood sugar 01/27/2021   • Elevated LDL cholesterol level 01/27/2021   • Diverticulosis 01/27/2021   • Asymptomatic microscopic hematuria 01/30/2020   • History of hepatitis E virus  "infection 08/27/2019   • History of stroke 08/27/2019   • Other specified hypothyroidism 08/27/2019   • Essential hypertension 08/27/2019   • Preventative health care 08/27/2019   • History of tobacco use 08/27/2019       Family History   Problem Relation Age of Onset   • No Known Problems Mother    • Cancer Father         bladder cancer; no tob   • Other Brother         memory/neuro issues ? alzheimers/ PD   • Other Brother         memory/neuro issues ? alzheimers/ PD   • Other Brother         memory/neuro issues ? alzheimers/ PD   • Other Brother         memory/neuro issues ? alzheimers/ PD   • No Known Problems Maternal Grandmother    • No Known Problems Maternal Grandfather    • Other Paternal Grandmother         dementia   • Heart Disease Paternal Grandfather        He  has a past medical history of Hypertension, Stroke (HCC), and Thyroid disease.  He  has a past surgical history that includes tonsillectomy and other.       Objective:   BP (!) 176/97 (BP Location: Left arm, Patient Position: Sitting, BP Cuff Size: Adult)   Pulse 76   Temp 36.3 °C (97.3 °F) (Oral)   Ht 1.905 m (6' 3\")   Wt 97.5 kg (215 lb)   BMI 26.87 kg/m²     Physical Exam    Constitutional: Alert, no distress, well-groomed.  Skin: No rashes in visible areas.  Eye: Round. Conjunctiva clear, lids normal. No icterus.   ENMT: Lips pink without lesions, good dentition, moist mucous membranes. Phonation normal.  Neck: No masses, no thyromegaly. Moves freely without pain.  CV: Pulse as reported by patient  Respiratory: Unlabored respiratory effort, no cough or audible wheeze  Psych: Alert and oriented x3, normal affect and mood.     Assessment and Plan:   The following treatment plan was discussed:     1. Chronic pansinusitis  - montelukast (SINGULAIR) 10 MG Tab; Take 1 Tab by mouth every day.  Dispense: 30 Tab; Refill: 3  - REFERRAL TO ALLERGY    2. Seasonal allergies  - azelastine (ASTELIN) 137 MCG/SPRAY nasal spray; Administer 1 Spray into " affected nostril(S) 2 times a day.  Dispense: 30 mL; Refill: 3  - montelukast (SINGULAIR) 10 MG Tab; Take 1 Tab by mouth every day.  Dispense: 30 Tab; Refill: 3  - REFERRAL TO ALLERGY    3. Impaired fasting blood sugar  - HEMOGLOBIN A1C; Future    4. Elevated liver enzymes  - Comp Metabolic Panel; Future  - GAMMA GT (GGT); Future    5. Elevated LDL cholesterol level    6. Essential hypertension    7. Diverticulosis    8. Close exposure to COVID-19 virus  - SARS-CoV-2 PCR (24 hour In-House): Collect NP swab in VTM; Future    Other orders  - losartan (COZAAR) 50 MG Tab; Take 1.5 Tabs by mouth every day.  Dispense: 45 Tab; Refill: 1    Based on my remote assessment of his chronic sinusitis symptoms I do not feel he would benefit from antibiotics at this time, patient agreed.  Together we decided to more optimally treat allergies with nasal antihistamine spray, could retry Flonase angling towards ear, trial of Singulair advised watch for any change in mood.  Referral to allergist.  He will let me know how this goes.    With impaired fasting glucose he will watch diet, improve exercise.  Work on 15 pound weight loss as well as cutting back or quitting alcohol as likely this is also affecting minimal elevation in AST.    He will consider statin, potential for cardiac CT, he will let me know.    Increase losartan from 50 to 75 mg and monitor blood pressure daily.  Advised stay under 2 g salt daily, lose 15 pounds, regular exercise, avoid medications elevate blood pressure, etc.    Follow-up: Return in about 1 month (around 2/27/2021).          English

## 2023-02-05 NOTE — ED PROVIDER NOTE - GASTROINTESTINAL, MLM
Abdomen soft, non-tender, no guarding. non-distended. I mashed on abd with NO apparent discomfort / change in activity.

## 2023-02-05 NOTE — ED PROVIDER NOTE - PROGRESS NOTE DETAILS
Patient doing well at this time.  Abdomen is still soft and nontender.  Patient has no longer been grabbing at his abdomen.  Discussed with patient's family as well as patient's aide regarding urine and x-ray findings.  They will follow-up with their primary care doctor this week.  They are comfortable with discharge home at this time.  Discussed with them regarding abdominal pain precaution instructions, patient return if any further abdominal pain, change in activity or behavior, fever, vomiting or diarrhea, or any other concerns.

## 2023-02-05 NOTE — ED PROVIDER NOTE - NSFOLLOWUPINSTRUCTIONS_ED_ALL_ED_FT
1) Follow-up with your Primary Medical Doctor or referred doctor. Call today / next business day for prompt follow-up.  2) Return to Emergency room for any worsening or persistent pain, weakness, fever, vomiting, diarrhea, unable to eat / drink, weak or dizzy, or any other concerning symptoms.  3) See attached instruction sheets for additional information, including information regarding signs and symptoms to look out for, reasons to seek immediate care and other important instructions.

## 2023-02-05 NOTE — ED ADULT TRIAGE NOTE - CHIEF COMPLAINT QUOTE
autistic non verbal male accompanied by parents who report pt was hunching over earlier today as if experiencing abdominal pain.

## 2023-02-05 NOTE — ED PROVIDER NOTE - OBJECTIVE STATEMENT
57-year-old male with history of anxiety, autism presents with brought in by family today.  Patient was reportedly having lunch, and afterwards the family felt as if he was leaning forward possibly indicating abdominal discomfort.  This lasted for an hour and at this time seems to have resolved.  Patient is no longer acting this way, and is back to his usual baseline.  No known fever or chills.  No cough/URI.  No known sick contacts.  No fall or recent trauma.  Patient has been urinating his usual amount today, he drinks a lot of fluids and therefore urinates frequently.  No change in these symptoms.  No aggravating or alleviating factors otherwise noted.  Patient otherwise at his usual baseline.  Patient previously vaccinated for COVID.

## 2023-02-05 NOTE — ED ADULT NURSE NOTE - OBJECTIVE STATEMENT
Non-verbal patient brought in by parents who reports patient was bending over after lunch and they think it is from abdominal pain.

## 2023-02-05 NOTE — ED ADULT NURSE NOTE - NSICDXPASTSURGICALHX_GEN_ALL_CORE_FT
PAST SURGICAL HISTORY:  History of dental surgery Comprenhensive Dental Treatment under general anesthesia on  2011, 2019 at [facility]

## 2023-02-05 NOTE — ED PROVIDER NOTE - PATIENT PORTAL LINK FT
You can access the FollowMyHealth Patient Portal offered by [facility] by registering at the following website: [email or web address]. By joining [facility]’s FollowMyHealth portal, you will also be able to view your health information using other applications (apps) compatible with our system.

## 2023-02-05 NOTE — ED PROVIDER NOTE - CLINICAL SUMMARY MEDICAL DECISION MAKING FREE TEXT BOX
Possible abdominal discomfort status post eating lunch, apparently resolved.  Patient afebrile with normal vital signs.  Patient with frequent urination, at baseline, however will check UA for possible infection.

## 2023-02-05 NOTE — ED PROVIDER NOTE - NSICDXPASTSURGICALHX_GEN_ALL_CORE_FT
PAST SURGICAL HISTORY:  History of dental surgery Comprenhensive Dental Treatment under general anesthesia on  2011, 2019 at [facility]

## 2023-02-06 LAB
CULTURE RESULTS: NO GROWTH — SIGNIFICANT CHANGE UP
SPECIMEN SOURCE: SIGNIFICANT CHANGE UP

## 2023-02-28 ENCOUNTER — APPOINTMENT (OUTPATIENT)
Dept: CARDIOLOGY | Facility: CLINIC | Age: 58
End: 2023-02-28

## 2023-04-12 ENCOUNTER — OUTPATIENT (OUTPATIENT)
Dept: OUTPATIENT SERVICES | Facility: HOSPITAL | Age: 58
LOS: 1 days | End: 2023-04-12
Payer: MEDICARE

## 2023-04-12 VITALS
RESPIRATION RATE: 16 BRPM | DIASTOLIC BLOOD PRESSURE: 79 MMHG | TEMPERATURE: 98 F | HEIGHT: 66 IN | OXYGEN SATURATION: 99 % | SYSTOLIC BLOOD PRESSURE: 122 MMHG | WEIGHT: 184.09 LBS | HEART RATE: 89 BPM

## 2023-04-12 DIAGNOSIS — K02.62 DENTAL CARIES ON SMOOTH SURFACE PENETRATING INTO DENTIN: ICD-10-CM

## 2023-04-12 DIAGNOSIS — K05.6 PERIODONTAL DISEASE, UNSPECIFIED: ICD-10-CM

## 2023-04-12 DIAGNOSIS — Z92.89 PERSONAL HISTORY OF OTHER MEDICAL TREATMENT: Chronic | ICD-10-CM

## 2023-04-12 DIAGNOSIS — Z01.818 ENCOUNTER FOR OTHER PREPROCEDURAL EXAMINATION: ICD-10-CM

## 2023-04-12 LAB
ANION GAP SERPL CALC-SCNC: 10 MMOL/L — SIGNIFICANT CHANGE UP (ref 5–17)
BUN SERPL-MCNC: 11 MG/DL — SIGNIFICANT CHANGE UP (ref 7–23)
CALCIUM SERPL-MCNC: 9.9 MG/DL — SIGNIFICANT CHANGE UP (ref 8.4–10.5)
CHLORIDE SERPL-SCNC: 102 MMOL/L — SIGNIFICANT CHANGE UP (ref 96–108)
CO2 SERPL-SCNC: 24 MMOL/L — SIGNIFICANT CHANGE UP (ref 22–31)
CREAT SERPL-MCNC: 1.02 MG/DL — SIGNIFICANT CHANGE UP (ref 0.5–1.3)
EGFR: 85 ML/MIN/1.73M2 — SIGNIFICANT CHANGE UP
GLUCOSE SERPL-MCNC: 88 MG/DL — SIGNIFICANT CHANGE UP (ref 70–99)
HCT VFR BLD CALC: 40.3 % — SIGNIFICANT CHANGE UP (ref 39–50)
HGB BLD-MCNC: 13.3 G/DL — SIGNIFICANT CHANGE UP (ref 13–17)
MCHC RBC-ENTMCNC: 29.2 PG — SIGNIFICANT CHANGE UP (ref 27–34)
MCHC RBC-ENTMCNC: 33 GM/DL — SIGNIFICANT CHANGE UP (ref 32–36)
MCV RBC AUTO: 88.6 FL — SIGNIFICANT CHANGE UP (ref 80–100)
NRBC # BLD: 0 /100 WBCS — SIGNIFICANT CHANGE UP (ref 0–0)
PLATELET # BLD AUTO: 341 K/UL — SIGNIFICANT CHANGE UP (ref 150–400)
POTASSIUM SERPL-MCNC: 3.9 MMOL/L — SIGNIFICANT CHANGE UP (ref 3.5–5.3)
POTASSIUM SERPL-SCNC: 3.9 MMOL/L — SIGNIFICANT CHANGE UP (ref 3.5–5.3)
RBC # BLD: 4.55 M/UL — SIGNIFICANT CHANGE UP (ref 4.2–5.8)
RBC # FLD: 12.4 % — SIGNIFICANT CHANGE UP (ref 10.3–14.5)
SODIUM SERPL-SCNC: 136 MMOL/L — SIGNIFICANT CHANGE UP (ref 135–145)
WBC # BLD: 6.12 K/UL — SIGNIFICANT CHANGE UP (ref 3.8–10.5)
WBC # FLD AUTO: 6.12 K/UL — SIGNIFICANT CHANGE UP (ref 3.8–10.5)

## 2023-04-12 PROCEDURE — 80048 BASIC METABOLIC PNL TOTAL CA: CPT

## 2023-04-12 PROCEDURE — G0463: CPT

## 2023-04-12 PROCEDURE — 85027 COMPLETE CBC AUTOMATED: CPT

## 2023-04-12 RX ORDER — LIDOCAINE HCL 20 MG/ML
0.2 VIAL (ML) INJECTION ONCE
Refills: 0 | Status: DISCONTINUED | OUTPATIENT
Start: 2023-05-03 | End: 2023-05-17

## 2023-04-12 RX ORDER — SODIUM CHLORIDE 9 MG/ML
3 INJECTION INTRAMUSCULAR; INTRAVENOUS; SUBCUTANEOUS EVERY 8 HOURS
Refills: 0 | Status: DISCONTINUED | OUTPATIENT
Start: 2023-05-03 | End: 2023-05-17

## 2023-04-12 NOTE — H&P PST ADULT - NSICDXPASTSURGICALHX_GEN_ALL_CORE_FT
PAST SURGICAL HISTORY:  History of dental surgery Comprenhensive Dental Treatment under general anesthesia on  2011, 2019 at [facility]

## 2023-04-12 NOTE — H&P PST ADULT - PROBLEM SELECTOR PLAN 1
Scheduled for comprehensive dental treatment under general anesthesia on 05/03/2023 with Dr. Graff  pst labs: cbc, bmp     PST instructions provided to group home attendant   , verbalized understanding. Scheduled for comprehensive dental treatment under general anesthesia on 05/03/2023 with Dr. Graff  pst labs: cbc, bmp     PST instructions provided to group home attendant ,Ino, verbalized understanding of instructions, PST folder given to attendant.

## 2023-04-12 NOTE — H&P PST ADULT - NSANTHOSAYNRD_GEN_A_CORE
unable to assess/No. TUNDE screening performed.  STOP BANG Legend: 0-2 = LOW Risk; 3-4 = INTERMEDIATE Risk; 5-8 = HIGH Risk

## 2023-04-12 NOTE — H&P PST ADULT - ASSESSMENT
Patient is non verbal, utilized group home attendant.   DASI score: 6.79 METS  DASI activity: Patient can walk, run small distances, climb 2-3 flights of stairs.   Loose teeth or denture: None    Mallampati: Class 3

## 2023-04-12 NOTE — H&P PST ADULT - HISTORY OF PRESENT ILLNESS
59 y/o male PMHx of Intellectual Disability, Autism, MR, Paranoid schizophrenia, Bipolar disorder, presents from group home for pre-procedure evaluation. Patient scheduled for comprehensive dental treatment under general anesthesia with Dr. Graff on 05/03/2023.    Group Home: [facility]: [address] [place] [address]  Group Home Attendant: Ino accompanying patient     Legal Guardian: Mother: Alejandrina Jarrod [phone], Brother:  Jarrod [phone]

## 2023-04-21 ENCOUNTER — APPOINTMENT (OUTPATIENT)
Dept: CARDIOLOGY | Facility: CLINIC | Age: 58
End: 2023-04-21

## 2023-04-26 ENCOUNTER — APPOINTMENT (OUTPATIENT)
Dept: CARDIOLOGY | Facility: CLINIC | Age: 58
End: 2023-04-26
Payer: MEDICARE

## 2023-04-26 ENCOUNTER — NON-APPOINTMENT (OUTPATIENT)
Age: 58
End: 2023-04-26

## 2023-04-26 VITALS
HEIGHT: 66 IN | SYSTOLIC BLOOD PRESSURE: 104 MMHG | WEIGHT: 183 LBS | BODY MASS INDEX: 29.41 KG/M2 | DIASTOLIC BLOOD PRESSURE: 76 MMHG

## 2023-04-26 DIAGNOSIS — Z01.810 ENCOUNTER FOR PREPROCEDURAL CARDIOVASCULAR EXAMINATION: ICD-10-CM

## 2023-04-26 PROCEDURE — 99214 OFFICE O/P EST MOD 30 MIN: CPT

## 2023-04-26 PROCEDURE — 93000 ELECTROCARDIOGRAM COMPLETE: CPT | Mod: NC

## 2023-04-26 NOTE — CARDIOLOGY SUMMARY
[de-identified] : sinus rhythm  ICRBBB  ( no change from prior ) [de-identified] : 12/9/21   EF 50-55%  mild DD ,  trace MR

## 2023-04-26 NOTE — DISCUSSION/SUMMARY
[FreeTextEntry1] : Abnormal electrocardiogram - stable , borderline EF on  similar to echo done in 2014 , \par \par Hyperlipidemia - Currently taking fish oil and tricor. improved TG , still low HDL ,\par \par \par Patient is optimal and stable for proposed low risk dental procedure , \par \par follow up after 6 months  [EKG obtained to assist in diagnosis and management of assessed problem(s)] : EKG obtained to assist in diagnosis and management of assessed problem(s)

## 2023-04-26 NOTE — HISTORY OF PRESENT ILLNESS
[FreeTextEntry1] : 58 year old male with PMH: mental retardation who presents to the office today for pre op cardiac evaluation for dental procedure under anaesthesia by  formal caregiver , non verbal , no complain  ,\par \par As per the caregiver states that patient has not demonstrated symptoms of shortness of breath, or chest pain.  There is no evidence of edema and he has had no syncope.  He has a great appetite.\par \par Last labs we have from   demonstrate total cholesterol 168, triglycerides 118, HDL 30, IYP454 done on Jan 2023 . his blood pressure in normal range   EKG showed sinus rhythm IVCD  ICRBB , had echo showed in December 2021  EF 50-55%  mild DD , which is same as before from 2014 \par

## 2023-05-02 ENCOUNTER — TRANSCRIPTION ENCOUNTER (OUTPATIENT)
Age: 58
End: 2023-05-02

## 2023-05-03 ENCOUNTER — TRANSCRIPTION ENCOUNTER (OUTPATIENT)
Age: 58
End: 2023-05-03

## 2023-05-03 ENCOUNTER — OUTPATIENT (OUTPATIENT)
Dept: INPATIENT UNIT | Facility: HOSPITAL | Age: 58
LOS: 1 days | End: 2023-05-03
Payer: MEDICARE

## 2023-05-03 VITALS
OXYGEN SATURATION: 96 % | RESPIRATION RATE: 16 BRPM | SYSTOLIC BLOOD PRESSURE: 149 MMHG | HEART RATE: 79 BPM | DIASTOLIC BLOOD PRESSURE: 83 MMHG

## 2023-05-03 VITALS
HEIGHT: 66 IN | DIASTOLIC BLOOD PRESSURE: 69 MMHG | SYSTOLIC BLOOD PRESSURE: 127 MMHG | HEART RATE: 85 BPM | WEIGHT: 184.09 LBS | OXYGEN SATURATION: 98 % | TEMPERATURE: 98 F | RESPIRATION RATE: 18 BRPM

## 2023-05-03 DIAGNOSIS — K05.6 PERIODONTAL DISEASE, UNSPECIFIED: ICD-10-CM

## 2023-05-03 DIAGNOSIS — Z92.89 PERSONAL HISTORY OF OTHER MEDICAL TREATMENT: Chronic | ICD-10-CM

## 2023-05-03 DIAGNOSIS — K02.62 DENTAL CARIES ON SMOOTH SURFACE PENETRATING INTO DENTIN: ICD-10-CM

## 2023-05-03 PROCEDURE — D7210: CPT

## 2023-05-03 PROCEDURE — D4341: CPT

## 2023-05-03 PROCEDURE — C9399: CPT

## 2023-05-03 PROCEDURE — D4210: CPT

## 2023-05-03 RX ORDER — FENOFIBRATE,MICRONIZED 130 MG
1 CAPSULE ORAL
Qty: 0 | Refills: 0 | DISCHARGE

## 2023-05-03 RX ORDER — RISPERIDONE 4 MG/1
1 TABLET ORAL
Refills: 0 | DISCHARGE

## 2023-05-03 RX ORDER — CHOLECALCIFEROL (VITAMIN D3) 125 MCG
1 CAPSULE ORAL
Qty: 0 | Refills: 0 | DISCHARGE

## 2023-05-03 RX ORDER — OMEGA-3 ACID ETHYL ESTERS 1 G
2 CAPSULE ORAL
Qty: 0 | Refills: 0 | DISCHARGE

## 2023-05-03 RX ORDER — DOCUSATE SODIUM 100 MG
3 CAPSULE ORAL
Qty: 0 | Refills: 0 | DISCHARGE

## 2023-05-03 RX ORDER — CEFAZOLIN SODIUM 1 G
2000 VIAL (EA) INJECTION ONCE
Refills: 0 | Status: COMPLETED | OUTPATIENT
Start: 2023-05-03 | End: 2023-05-03

## 2023-05-03 RX ORDER — QUETIAPINE FUMARATE 200 MG/1
1 TABLET, FILM COATED ORAL
Qty: 0 | Refills: 0 | DISCHARGE

## 2023-05-03 RX ORDER — ALENDRONATE SODIUM 70 MG/1
1 TABLET ORAL
Qty: 0 | Refills: 0 | DISCHARGE

## 2023-05-03 NOTE — ASU DISCHARGE PLAN (ADULT/PEDIATRIC) - NS MD DC FALL RISK RISK
For information on Fall & Injury Prevention, visit: [email or web address] OR  [email or web address] OR  https://www.cdc.gov/steadi/patient.html

## 2023-05-03 NOTE — ASU PATIENT PROFILE, ADULT - FALL HARM RISK - HARM RISK INTERVENTIONS

## 2023-05-03 NOTE — ASU DISCHARGE PLAN (ADULT/PEDIATRIC) - ASU DC SPECIAL INSTRUCTIONSFT
comprehensive dental treatment under general anesthesia   ******************************************************************************************   tylenol for pain for the next two days for comfort   ******************************************************************************************   return to program tomorrow if patient feels well  ******************************************************************************************   exam, x-rays, one extraction  performed to the UL side wisdom tooth area     no straw for two days and keep head elevated for the next two days and nights    ice to the left side as much as tolerated    antibiotics will be prescribed to his pharmacy     periodontal treatment performed and restorative care will be performed in the second stage     drink lots of fluids for hydration    see Dr Graff on 5/11 at 11 am at [facility] appt is set

## 2023-06-23 NOTE — ED PROCEDURE NOTE - CPROC ED INFORMED CONSENT1
[facility]  Nurses Note -- 4 Eyes      6/23/2023       Skin assessed on: Q Shift      [] No Pressure Injuries Present    []Prevention Measures Documented    [x] Yes LDA  for Pressure Injury Previously documented     [] Yes New Pressure Injury Discovered   [] LDA for New Pressure Injury Added      Attending RN:  Thalia Olivares RN     Second RN:  SUSIE Solorzano         Benefits, risks, and possible complications of procedure explained to patient/caregiver who verbalized understanding and gave verbal consent.

## 2023-12-14 ENCOUNTER — APPOINTMENT (OUTPATIENT)
Dept: CARDIOLOGY | Facility: CLINIC | Age: 58
End: 2023-12-14
Payer: MEDICARE

## 2023-12-14 VITALS
SYSTOLIC BLOOD PRESSURE: 110 MMHG | WEIGHT: 184 LBS | OXYGEN SATURATION: 98 % | HEART RATE: 88 BPM | BODY MASS INDEX: 29.57 KG/M2 | DIASTOLIC BLOOD PRESSURE: 70 MMHG | HEIGHT: 66 IN | RESPIRATION RATE: 14 BRPM

## 2023-12-14 DIAGNOSIS — I45.10 UNSPECIFIED RIGHT BUNDLE-BRANCH BLOCK: ICD-10-CM

## 2023-12-14 PROCEDURE — 99214 OFFICE O/P EST MOD 30 MIN: CPT

## 2023-12-14 PROCEDURE — 93308 TTE F-UP OR LMTD: CPT

## 2023-12-14 NOTE — DISCUSSION/SUMMARY
[FreeTextEntry1] : Abnormal electrocardiogram - stable , borderline EF on  similar to echo done in 2014 ,   Hyperlipidemia - Currently taking fish oil and tricor. improved TG , still low HDL ,  Mild LV dysfunction : follow up echo showed hyperdynamic LVEF  done today ,    follow up after 6 months

## 2023-12-14 NOTE — HISTORY OF PRESENT ILLNESS
[FreeTextEntry1] : 58 year old male with PMH: mental retardation who presents to the office today for follow up     As per the caregiver states that patient has not demonstrated symptoms of shortness of breath, or chest pain.  There is no evidence of edema and he has had no syncope.  He has a great appetite.    patient  is restless , not cooperative ,  his blood pressure  HR controlled   Last labs we have from   demonstrate total cholesterol 168, triglycerides 118, HDL 30, [id number] done on Jan 2023 . his blood pressure in normal range   EKG showed sinus rhythm IVCD  ICRBB , had echo showed in December 2021  EF 50-55%  mild DD , which is same as before from 2014   Patient had echo today , limited TDS as patient is not cooperative  EF  appears hyperdynamic LVEF

## 2023-12-14 NOTE — PHYSICAL EXAM
[Well Nourished] : well nourished [Normal Conjunctiva] : normal conjunctiva [Normal Venous Pressure] : normal venous pressure [Normal] : clear lung fields, good air entry, no respiratory distress [Soft] : abdomen soft [Non Tender] : non-tender [Normal Gait] : normal gait [No Edema] : no edema [No Rash] : no rash [Moves all extremities] : moves all extremities [Cognitive Impairment] : cognitive impairment [de-identified] : restless not cooperative today

## 2023-12-14 NOTE — CARDIOLOGY SUMMARY
[de-identified] : sinus rhythm  ICRBBB  ( no change from prior ) [de-identified] : 12/9/21   EF 50-55%  mild DD ,  trace MR  12/15/23  Hyperdynamic LVEF  I limited study  not cooperative patient

## 2024-02-14 NOTE — H&P PST ADULT - MENTAL STATUS
Goal Outcome Evaluation:    PRIMARY Concern: abd pain, N/V, heavy menses  SAFETY RISK Concerns (fall risk, behaviors, etc.): fall risk  Isolation/Type: none  Tests/Procedures for NEXT shift: AM labs  Consults? (Pending/following, signed-off?) OBGYN following  Where is patient from? (Home, TCU, etc.): home  Other Important info for NEXT shift: scant to none vaginal bleeding  Anticipated DC date & active delays: TBD  _____________________________________________________________________________  SUMMARY NOTE:  Orientation/Cognitive: A&Ox4  Observation Goals (Met/ Not Met): not met  Mobility Level/Assist Equipment: SBA  Antibiotics & Plan (IV/po, length of tx left): IV flagyl, PO doxy  Pain Management: denies  Tele/VS/O2: VSS on RA  ABNL Lab/BG: hgb 7.9, WBC 16.4  Diet: Regular  Bowel/Bladder: continent  Skin Concerns: none  Drains/Devices: PIV SL x2  Patient Stated Goal for Today: rest    PRIMARY DIAGNOSIS: ACUTE PAIN  OUTPATIENT/OBSERVATION GOALS TO BE MET BEFORE DISCHARGE:  1. Pain Status: Pain free.     2. Return to near baseline physical activity: Yes     3. Cleared for discharge by consultants (if involved): No        Discharge Planner Nurse   Safe discharge environment identified: Yes  Barriers to discharge: Yes       Entered by: Priya Martinez RN 02/14/2024 1:30 AM        Please review provider order for any additional goals.   Nurse to notify provider when observation goals have been met and patient is ready for discharge.       Pt. awake, restless in the room, sometimes yelling, repetitively drinking water from sink and using toiler to void

## 2024-06-13 ENCOUNTER — APPOINTMENT (OUTPATIENT)
Dept: CARDIOLOGY | Facility: CLINIC | Age: 59
End: 2024-06-13
Payer: MEDICARE

## 2024-06-13 ENCOUNTER — NON-APPOINTMENT (OUTPATIENT)
Age: 59
End: 2024-06-13

## 2024-06-13 VITALS
SYSTOLIC BLOOD PRESSURE: 128 MMHG | WEIGHT: 179 LBS | OXYGEN SATURATION: 96 % | HEIGHT: 66 IN | BODY MASS INDEX: 28.77 KG/M2 | DIASTOLIC BLOOD PRESSURE: 64 MMHG | HEART RATE: 113 BPM

## 2024-06-13 DIAGNOSIS — E78.5 HYPERLIPIDEMIA, UNSPECIFIED: ICD-10-CM

## 2024-06-13 DIAGNOSIS — F79 UNSPECIFIED INTELLECTUAL DISABILITIES: ICD-10-CM

## 2024-06-13 DIAGNOSIS — R94.31 ABNORMAL ELECTROCARDIOGRAM [ECG] [EKG]: ICD-10-CM

## 2024-06-13 DIAGNOSIS — R00.0 TACHYCARDIA, UNSPECIFIED: ICD-10-CM

## 2024-06-13 PROCEDURE — G2211 COMPLEX E/M VISIT ADD ON: CPT

## 2024-06-13 PROCEDURE — 93000 ELECTROCARDIOGRAM COMPLETE: CPT

## 2024-06-13 PROCEDURE — 99214 OFFICE O/P EST MOD 30 MIN: CPT

## 2024-06-13 NOTE — HISTORY OF PRESENT ILLNESS
[FreeTextEntry1] : 58 year old male with PMH: mental retardation who presents to the office today for follow up    anxious   As per the caregiver states that patient has not demonstrated symptoms of shortness of breath, or chest pain on activity  ,He has a great appetite.    patient  is restless , not cooperative ,  his blood pressure  HR controlled   Last blood work in april 2024  total cholesterol 152, triglycerides 63, HDL39, [id number] . his blood pressure in normal range   EKG showed sinus rhythm IVCD  ICRBB , had echo showed in December 2023 EF hyperdynamic EF   Patient had echo today , limited TDS as patient is not cooperative  EF  appears hyperdynamic LVEF

## 2024-06-13 NOTE — DISCUSSION/SUMMARY
[FreeTextEntry1] : Abnormal electrocardiogram - stable ,hyperdynamic EF on recent echo  tachycardia likely due to anxiety and restless ness ,   Hyperlipidemia - Currently taking fish oil and tricor 48 mg po daily . improved TG , still low HDL ,  Mild LV dysfunction : follow up echo showed hyperdynamic LVEF  done today ,    follow up after 6 months  [EKG obtained to assist in diagnosis and management of assessed problem(s)] : EKG obtained to assist in diagnosis and management of assessed problem(s)

## 2024-06-13 NOTE — PHYSICAL EXAM
[Well Nourished] : well nourished [Normal Conjunctiva] : normal conjunctiva [Normal Venous Pressure] : normal venous pressure [Normal] : clear lung fields, good air entry, no respiratory distress [Soft] : abdomen soft [Non Tender] : non-tender [Normal Gait] : normal gait [No Edema] : no edema [No Rash] : no rash [Moves all extremities] : moves all extremities [Cognitive Impairment] : cognitive impairment [de-identified] : restless not cooperative today

## 2024-06-13 NOTE — CARDIOLOGY SUMMARY
[de-identified] : 6/13/24sinus rhythm  ICRBBB  ( no change from prior ) [de-identified] : 12/9/21   EF 50-55%  mild DD ,  trace MR  12/15/23  Hyperdynamic LVEF  I limited study  not cooperative patient

## 2024-08-15 ENCOUNTER — EMERGENCY (EMERGENCY)
Facility: HOSPITAL | Age: 59
LOS: 1 days | Discharge: HOME | End: 2024-08-15
Attending: EMERGENCY MEDICINE | Admitting: EMERGENCY MEDICINE
Payer: MEDICARE

## 2024-08-15 VITALS
WEIGHT: 184.09 LBS | SYSTOLIC BLOOD PRESSURE: 144 MMHG | HEIGHT: 68 IN | TEMPERATURE: 98 F | DIASTOLIC BLOOD PRESSURE: 88 MMHG | HEART RATE: 86 BPM | OXYGEN SATURATION: 98 % | RESPIRATION RATE: 16 BRPM

## 2024-08-15 DIAGNOSIS — Z92.89 PERSONAL HISTORY OF OTHER MEDICAL TREATMENT: Chronic | ICD-10-CM

## 2024-08-15 PROCEDURE — 99282 EMERGENCY DEPT VISIT SF MDM: CPT | Mod: 25

## 2024-08-15 PROCEDURE — 99283 EMERGENCY DEPT VISIT LOW MDM: CPT | Mod: FS,25

## 2024-08-15 PROCEDURE — G0168: CPT

## 2024-08-15 PROCEDURE — 12011 RPR F/E/E/N/L/M 2.5 CM/<: CPT

## 2024-08-15 NOTE — ED ADULT NURSE NOTE - NSICDXPASTSURGICALHX_GEN_ALL_CORE_FT
PAST SURGICAL HISTORY:  History of dental surgery Comprenhensive Dental Treatment under general anesthesia on  2011, 2019 at [facility]

## 2024-08-15 NOTE — ED PROVIDER NOTE - NSICDXPASTSURGICALHX_GEN_ALL_CORE_FT
PAST SURGICAL HISTORY:  History of dental surgery Comprenhensive Dental Treatment under general anesthesia on  2011, 2019 at [facility]

## 2024-08-15 NOTE — ED ADULT TRIAGE NOTE - CHIEF COMPLAINT QUOTE
Pt bend down to pick something up off the floor and when he got up he hit his head on a cabinet door; denies LOC; no blood thinners

## 2024-08-15 NOTE — ED ADULT NURSE NOTE - NSFALLUNIVINTERV_ED_ALL_ED
Bed/Stretcher in lowest position, wheels locked, appropriate side rails in place/Call bell, personal items and telephone in reach/Instruct patient to call for assistance before getting out of bed/chair/stretcher/Non-slip footwear applied when patient is off stretcher/[facility] to call system/Physically safe environment - no spills, clutter or unnecessary equipment/Purposeful proactive rounding/Room/bathroom lighting operational, light cord in reach

## 2024-08-15 NOTE — ED PROVIDER NOTE - NSFOLLOWUPINSTRUCTIONS_ED_ALL_ED_FT
Follow up with your PCP for wound check. return for any signs of infection (redness/swelling/discharge/fever).         Facial Laceration    A facial laceration is a cut (laceration) on the face. It is caused by any injury that cuts or tears the skin or tissues on the face. Facial lacerations can bleed and be painful.    You may need medical attention to stop the bleeding, help the wound heal, lower your risk of infection, and prevent scarring. Lacerations usually heal quickly after treatment.    What are the causes?  This condition may be caused by:  A motor vehicle crash.  A sports injury.  A violent attack.  A fall.  What are the signs or symptoms?  Common symptoms of this condition include:  An obvious cut on the face.  Bleeding.  Pain.  Swelling.  Bruising.  A change in the appearance of the face.  How is this diagnosed?  Your health care provider can diagnose a facial laceration by doing a physical exam and asking how the injury happened. Your health care provider may also check for areas of bleeding, tissue damage, nerve injury, and objects (foreign bodies) in your wound.    How is this treated?  Treatment for a facial laceration depends on how severe and deep the wound is. It also depends on the risk for infection. First, your health care provider will clean the wound to prevent infection. Then, your health care provider will decide whether to close the wound. This depends on how deep the laceration is and how long ago your injury happened. If there is an increased risk of infection, the wound will not be closed.  If your wound needs to be closed:  Your health care provider will use stitches (sutures), skin glue (skin adhesive), or skin adhesive strips to repair the laceration.  Your health care provider may numb the area around your wound by injecting a numbing medicine in and around your laceration before doing the sutures.  Torn skin edges or dead skin may be removed.  If sutures are used, the laceration may be closed in layers. Absorbable sutures will be used for deep tissues and muscle. Removable sutures will be used to close the skin.  You may be given:  Pain medicine.  A tetanus shot.  Oral antibiotic medicines.  Antibiotic ointment.  Follow these instructions at home:  Wound care    Follow instructions from your health care provider about how to take care of your wound. Make sure you:  Wash your hands with soap and water for at least 20 seconds before and after you change your bandage (dressing). If soap and water are not available, use hand .  Change your dressing as told by your health care provider.  Leave sutures, skin adhesive, or adhesive strips in place. These skin closures may need to stay in place for 2 weeks or longer. If adhesive strip edges start to loosen and curl up, you may trim the loose edges. Do not remove adhesive strips completely unless your health care provider tells you to do that.  These instructions will vary depending on how the wound was closed.    For sutures:      Keep the wound clean and dry.  If you were given a dressing, change it at least once a day, or as told by your health care provider. Also change the dressing if it gets wet or dirty.  Wash the wound with soap and water two times a day, or as told by your health care provider. Rinse off the soap with water. Pat the wound dry with a clean towel.  After cleaning, apply a thin layer of antibiotic ointment as told by your health care provider. This helps prevent infection and keeps the dressing from sticking to the wound.  You may shower as usual after the first 24 hours. Do not soak the wound until the sutures are removed.  Return to have your sutures removed as told by your health care provider.  Do not wear makeup in the area of the wound until your health care provider has approved.  For skin adhesive:      You may briefly wet your wound in the shower or bath.  Do not soak or scrub the wound.  Do not swim.  Do not sweat heavily until the skin adhesive has fallen off on its own.  After showering or bathing, gently pat the wound dry with a clean towel.  Do not apply liquid medicine, cream medicine, ointment, or makeup to your wound while the skin adhesive is in place. This may loosen the film before your wound is healed.  If you have a dressing over your wound, be careful not to apply tape directly over the skin adhesive. This may pull off the adhesive before the wound is healed.  Do not spend a long time in the sun or use a tanning lamp while the skin adhesive is in place.  The skin adhesive will usually remain in place for 5–10 days and then naturally fall off the skin. Do not pick at the adhesive film.  For skin adhesive strips:      Keep the wound clean and dry.  Do not let the skin adhesive strips get wet.  Bathe carefully to keep the wound and adhesive strips dry. If the wound gets wet, pat it dry with a clean towel right away.  Skin adhesive strips fall off on their own over time. You may trim the strips as the wound heals. Do not remove skin adhesive strips that are still stuck to the wound.  General instructions    Check your wound area every day for signs of infection. Check for:  More redness, swelling, or pain.  Fluid or blood.  Warmth.  Pus or a bad smell.  Take over-the-counter and prescription medicines only as told by your health care provider.  If you were prescribed an antibiotic medicine, take it or apply it as told by your health care provider. Do not stop using the antibiotic even if you start to feel better.  After the laceration has healed:  Know that it can take a year or two for redness or scarring to fade.  Apply sunscreen to the skin of your healed wound to minimize scarring. Ultraviolet (UV) rays can darken scar tissue.  Contact a health care provider if:  You have a fever. A fever is a body temperature that is 100.4°F (38°C) or higher.  You have more redness, swelling, or pain around your wound.  You have fluid or blood coming from your wound.  Your wound feels warm to the touch.  You have pus or a bad smell coming from your wound.  Get help right away if:  You have a red streak going away from your wound.  Summary  You may need treatment for a facial laceration to prevent infection, stop bleeding, help healing, and prevent scarring.  A deep laceration may be closed with stitches (sutures).  Follow your health care provider's wound care instructions carefully.  This information is not intended to replace advice given to you by your health care provider. Make sure you discuss any questions you have with your health care provider.    Document Revised: 03/17/2021 Document Reviewed: 03/17/2021  Elsevier Patient Education © 2024 [employer]

## 2024-08-15 NOTE — ED PROVIDER NOTE - PATIENT PORTAL LINK FT
You can access the FollowMyHealth Patient Portal offered by [facility] by registering at the following website: [email or web address]. By joining [facility]’s FollowMyHealth portal, you will also be able to view your health information using other applications (apps) compatible with our system.

## 2024-08-15 NOTE — ED PROVIDER NOTE - CARE PROVIDERS DIRECT ADDRESSES
Breath Sounds equal & clear to percussion & auscultation, no accessory muscle use ,DirectAddress_Unknown

## 2024-08-15 NOTE — ED PROVIDER NOTE - CLINICAL SUMMARY MEDICAL DECISION MAKING FREE TEXT BOX
Patient hit his left upper forehead on the edge of a cabinet door.  Has a 2 cm superficial laceration.  Will apply Dermabond and patient can follow-up in group home.  No indication for CAT scan at this time.

## 2024-08-15 NOTE — ED PROCEDURE NOTE - ATTENDING APP SHARED VISIT CONTRIBUTION OF CARE
Ady Anand MD: I have personally performed a face to face diagnostic evaluation on this patient.  I have reviewed the PA note and agree with the history, exam, and plan of care, except as noted.  History and Exam by me shows same findings as documented    Wound closed

## 2024-08-15 NOTE — ED PROVIDER NOTE - PHYSICAL EXAMINATION
Constitutional: Awake, Alert, non-toxic. NAD  HEAD: Normocephalic, (+) left forehead 1.5 cm laceration superficial   EYES: PERRL, EOM intact, conjunctiva and sclera are clear bilaterally  ENT: No rodriguez sign. No rhinorrhea, normal pharynx, patent, no tonsillar exudate or enlargement, mucous membranes pink/moist, no erythema, no drooling or stridor.   NECK: Supple, non-tender  BACK: No midline TTP.   RESPIRATORY: Normal respiratory effort  ABDOMEN: Soft; non-tender, non-distended  EXTREMITIES: Full passive and active ROM in all extremities; non-tender to palpation  SKIN: Warm, dry; good skin turgor, no apparent lesions or rashes, no ecchymosis, brisk capillary refill.  NEURO: Awake and Alert, Cn 2-12 intact. 5/5 motor function.

## 2024-08-15 NOTE — ED PROVIDER NOTE - OBJECTIVE STATEMENT
60 y/o male with PMHX MR presents today with aid due to head injury. pt sustained a laceration of left forehead. pt aid reports patient is acting normal self. Witnessed injury - patient dropped a medication and bent to pick it up, when he stood upright he hit his head on the cabinet door. Denies LOC, vomiting, lethargy, blood thinner use, or any other complaints.

## 2024-12-12 ENCOUNTER — APPOINTMENT (OUTPATIENT)
Dept: CARDIOLOGY | Facility: CLINIC | Age: 59
End: 2024-12-12
Payer: MEDICARE

## 2024-12-12 ENCOUNTER — NON-APPOINTMENT (OUTPATIENT)
Age: 59
End: 2024-12-12

## 2024-12-12 VITALS
HEIGHT: 66 IN | SYSTOLIC BLOOD PRESSURE: 126 MMHG | DIASTOLIC BLOOD PRESSURE: 70 MMHG | OXYGEN SATURATION: 96 % | WEIGHT: 183 LBS | HEART RATE: 88 BPM | BODY MASS INDEX: 29.41 KG/M2

## 2024-12-12 DIAGNOSIS — I51.9 HEART DISEASE, UNSPECIFIED: ICD-10-CM

## 2024-12-12 DIAGNOSIS — R00.0 TACHYCARDIA, UNSPECIFIED: ICD-10-CM

## 2024-12-12 DIAGNOSIS — R94.31 ABNORMAL ELECTROCARDIOGRAM [ECG] [EKG]: ICD-10-CM

## 2024-12-12 DIAGNOSIS — E78.5 HYPERLIPIDEMIA, UNSPECIFIED: ICD-10-CM

## 2024-12-12 DIAGNOSIS — I45.10 UNSPECIFIED RIGHT BUNDLE-BRANCH BLOCK: ICD-10-CM

## 2024-12-12 PROCEDURE — G2211 COMPLEX E/M VISIT ADD ON: CPT

## 2024-12-12 PROCEDURE — 99214 OFFICE O/P EST MOD 30 MIN: CPT

## 2024-12-12 PROCEDURE — 93000 ELECTROCARDIOGRAM COMPLETE: CPT

## 2025-04-24 ENCOUNTER — OUTPATIENT (OUTPATIENT)
Dept: OUTPATIENT SERVICES | Facility: HOSPITAL | Age: 60
LOS: 1 days | End: 2025-04-24
Payer: MEDICARE

## 2025-04-24 VITALS
OXYGEN SATURATION: 97 % | TEMPERATURE: 98 F | HEIGHT: 68 IN | SYSTOLIC BLOOD PRESSURE: 126 MMHG | HEART RATE: 80 BPM | DIASTOLIC BLOOD PRESSURE: 80 MMHG | RESPIRATION RATE: 18 BRPM | WEIGHT: 184.97 LBS

## 2025-04-24 DIAGNOSIS — K05.6 PERIODONTAL DISEASE, UNSPECIFIED: ICD-10-CM

## 2025-04-24 DIAGNOSIS — Z92.89 PERSONAL HISTORY OF OTHER MEDICAL TREATMENT: Chronic | ICD-10-CM

## 2025-04-24 DIAGNOSIS — K02.61 DENTAL CARIES ON SMOOTH SURFACE LIMITED TO ENAMEL: ICD-10-CM

## 2025-04-24 DIAGNOSIS — Z01.818 ENCOUNTER FOR OTHER PREPROCEDURAL EXAMINATION: ICD-10-CM

## 2025-04-24 DIAGNOSIS — F84.0 AUTISTIC DISORDER: ICD-10-CM

## 2025-04-24 LAB
ANION GAP SERPL CALC-SCNC: 13 MMOL/L — SIGNIFICANT CHANGE UP (ref 5–17)
BUN SERPL-MCNC: 16 MG/DL — SIGNIFICANT CHANGE UP (ref 7–23)
CALCIUM SERPL-MCNC: 9.9 MG/DL — SIGNIFICANT CHANGE UP (ref 8.4–10.5)
CHLORIDE SERPL-SCNC: 101 MMOL/L — SIGNIFICANT CHANGE UP (ref 96–108)
CO2 SERPL-SCNC: 24 MMOL/L — SIGNIFICANT CHANGE UP (ref 22–31)
CREAT SERPL-MCNC: 1.14 MG/DL — SIGNIFICANT CHANGE UP (ref 0.5–1.3)
EGFR: 74 ML/MIN/1.73M2 — SIGNIFICANT CHANGE UP
EGFR: 74 ML/MIN/1.73M2 — SIGNIFICANT CHANGE UP
GLUCOSE SERPL-MCNC: 90 MG/DL — SIGNIFICANT CHANGE UP (ref 70–99)
HCT VFR BLD CALC: 38.7 % — LOW (ref 39–50)
HGB BLD-MCNC: 12.9 G/DL — LOW (ref 13–17)
MCHC RBC-ENTMCNC: 29.9 PG — SIGNIFICANT CHANGE UP (ref 27–34)
MCHC RBC-ENTMCNC: 33.3 G/DL — SIGNIFICANT CHANGE UP (ref 32–36)
MCV RBC AUTO: 89.6 FL — SIGNIFICANT CHANGE UP (ref 80–100)
NRBC BLD AUTO-RTO: 0 /100 WBCS — SIGNIFICANT CHANGE UP (ref 0–0)
PLATELET # BLD AUTO: 315 K/UL — SIGNIFICANT CHANGE UP (ref 150–400)
POTASSIUM SERPL-MCNC: 3.5 MMOL/L — SIGNIFICANT CHANGE UP (ref 3.5–5.3)
POTASSIUM SERPL-SCNC: 3.5 MMOL/L — SIGNIFICANT CHANGE UP (ref 3.5–5.3)
RBC # BLD: 4.32 M/UL — SIGNIFICANT CHANGE UP (ref 4.2–5.8)
RBC # FLD: 12.8 % — SIGNIFICANT CHANGE UP (ref 10.3–14.5)
SODIUM SERPL-SCNC: 138 MMOL/L — SIGNIFICANT CHANGE UP (ref 135–145)
WBC # BLD: 7.74 K/UL — SIGNIFICANT CHANGE UP (ref 3.8–10.5)
WBC # FLD AUTO: 7.74 K/UL — SIGNIFICANT CHANGE UP (ref 3.8–10.5)

## 2025-04-24 PROCEDURE — 85027 COMPLETE CBC AUTOMATED: CPT

## 2025-04-24 PROCEDURE — G0463: CPT

## 2025-04-24 PROCEDURE — 80048 BASIC METABOLIC PNL TOTAL CA: CPT

## 2025-04-24 NOTE — H&P PST ADULT - NSICDXPASTMEDICALHX_GEN_ALL_CORE_FT
PAST MEDICAL HISTORY:  Anxiety     Autism     BPH (benign prostatic hyperplasia)     Cauliflower left ear     Constipation     Dental caries on smooth surface limited to enamel     Hyperlipidemia     Incomplete RBBB     Osteoporosis     Periodontal disease, unspecified     Psychomotor retardation     Schizophrenia

## 2025-04-24 NOTE — H&P PST ADULT - NSICDXPASTSURGICALHX_GEN_ALL_CORE_FT
PAST SURGICAL HISTORY:  History of dental surgery Comprenhensive Dental Treatment under general anesthesia on  2011, 2019 at [facility]

## 2025-04-24 NOTE — H&P PST ADULT - PROBLEM SELECTOR PLAN 2
Scheduled for dental treatment on 5/15/2025. Surgical instructions reviewed and provided to [facility].

## 2025-04-24 NOTE — H&P PST ADULT - HISTORY OF PRESENT ILLNESS
61 y/o male PMHx of Intellectual Disability, Autism, MR, Paranoid schizophrenia, Bipolar disorder, osteoporosis, HLD and BPH presents from group home for pre-procedure evaluation. Patient scheduled for comprehensive dental treatment under general anesthesia with Dr. Graff on 05/15/2025.    Group Home: [facility]: [address] [place] [address]  Group Home Attendant: Mckayla Neves accompanying patient [phone]    Legal Guardian: Brother: Paul Kelleyrne [phone]

## 2025-05-10 PROBLEM — K02.61 DENTAL CARIES ON SMOOTH SURFACE LIMITED TO ENAMEL: Chronic | Status: ACTIVE | Noted: 2025-04-24

## 2025-05-10 PROBLEM — K05.6 PERIODONTAL DISEASE, UNSPECIFIED: Chronic | Status: ACTIVE | Noted: 2025-04-24

## 2025-05-15 NOTE — ASU PATIENT PROFILE, ADULT - NSICDXPASTSURGICALHX_GEN_ALL_CORE_FT
PAST SURGICAL HISTORY:  History of dental surgery Comprenhensive Dental Treatment under general anesthesia on  2011, 2019 at [facility]

## 2025-05-16 ENCOUNTER — TRANSCRIPTION ENCOUNTER (OUTPATIENT)
Age: 60
End: 2025-05-16

## 2025-05-16 ENCOUNTER — OUTPATIENT (OUTPATIENT)
Dept: INPATIENT UNIT | Facility: HOSPITAL | Age: 60
LOS: 1 days | End: 2025-05-16

## 2025-05-16 VITALS
DIASTOLIC BLOOD PRESSURE: 86 MMHG | OXYGEN SATURATION: 100 % | RESPIRATION RATE: 15 BRPM | HEART RATE: 81 BPM | SYSTOLIC BLOOD PRESSURE: 127 MMHG

## 2025-05-16 VITALS
TEMPERATURE: 98 F | HEART RATE: 79 BPM | OXYGEN SATURATION: 100 % | SYSTOLIC BLOOD PRESSURE: 147 MMHG | DIASTOLIC BLOOD PRESSURE: 85 MMHG | WEIGHT: 184.97 LBS | RESPIRATION RATE: 18 BRPM | HEIGHT: 68 IN

## 2025-05-16 DIAGNOSIS — K02.61 DENTAL CARIES ON SMOOTH SURFACE LIMITED TO ENAMEL: ICD-10-CM

## 2025-05-16 DIAGNOSIS — Z92.89 PERSONAL HISTORY OF OTHER MEDICAL TREATMENT: Chronic | ICD-10-CM

## 2025-05-16 DEVICE — SURGICEL 2 X 14": Type: IMPLANTABLE DEVICE | Site: BILATERAL | Status: FUNCTIONAL

## 2025-05-16 NOTE — ASU DISCHARGE PLAN (ADULT/PEDIATRIC) - ASU DC SPECIAL INSTRUCTIONSFT
comprehensive dental treatment under general anesthesia, exam, xrays, cleaning, periodontal treatments and fluoride     no extractions performed    tylenol prn pain     see DR Graff in one week, [place] [facility] [phone] 11 am     resume all medications    return to routine activities tomorrow if patient feels well

## 2025-05-16 NOTE — ASU DISCHARGE PLAN (ADULT/PEDIATRIC) - NURSING INSTRUCTIONS
Take Tylenol every 6 hours as needed for pain. DO NOT TAKE MORE THAN 4000 MG OF TYLENOL in a 24 hour period.

## 2025-05-16 NOTE — ASU DISCHARGE PLAN (ADULT/PEDIATRIC) - FINANCIAL ASSISTANCE
[facility] provides services at a reduced cost to those who are determined to be eligible through [facility]’s financial assistance program. Information regarding [facility]’s financial assistance program can be found by going to [email or web address] or by calling [phone].

## 2025-06-12 ENCOUNTER — APPOINTMENT (OUTPATIENT)
Dept: CARDIOLOGY | Facility: CLINIC | Age: 60
End: 2025-06-12
Payer: MEDICARE

## 2025-06-12 VITALS
DIASTOLIC BLOOD PRESSURE: 70 MMHG | HEART RATE: 88 BPM | SYSTOLIC BLOOD PRESSURE: 126 MMHG | HEIGHT: 66 IN | OXYGEN SATURATION: 96 % | BODY MASS INDEX: 29.41 KG/M2 | WEIGHT: 183 LBS | RESPIRATION RATE: 14 BRPM

## 2025-06-12 PROCEDURE — 99214 OFFICE O/P EST MOD 30 MIN: CPT

## 2025-06-12 PROCEDURE — G2211 COMPLEX E/M VISIT ADD ON: CPT

## 2025-06-12 PROCEDURE — 93000 ELECTROCARDIOGRAM COMPLETE: CPT

## 2025-07-07 ENCOUNTER — APPOINTMENT (OUTPATIENT)
Dept: GASTROENTEROLOGY | Facility: CLINIC | Age: 60
End: 2025-07-07
Payer: MEDICARE

## 2025-07-07 VITALS
SYSTOLIC BLOOD PRESSURE: 118 MMHG | OXYGEN SATURATION: 97 % | BODY MASS INDEX: 29.89 KG/M2 | DIASTOLIC BLOOD PRESSURE: 76 MMHG | HEART RATE: 78 BPM | WEIGHT: 186 LBS | HEIGHT: 66 IN

## 2025-07-07 PROCEDURE — 99204 OFFICE O/P NEW MOD 45 MIN: CPT

## 2025-07-07 PROCEDURE — G2211 COMPLEX E/M VISIT ADD ON: CPT

## 2025-07-07 RX ORDER — METRONIDAZOLE 7.5 MG/ML
0.75 LOTION TOPICAL
Refills: 0 | Status: ACTIVE | COMMUNITY

## 2025-07-09 RX ORDER — SENNOSIDES 8.6 MG/1
8.6 TABLET ORAL
Qty: 90 | Refills: 2 | Status: ACTIVE | COMMUNITY
Start: 2025-07-09 | End: 1900-01-01

## 2025-07-09 RX ORDER — SENNOSIDES 8.6 MG/1
8.6 CAPSULE, GELATIN COATED ORAL
Qty: 180 | Refills: 0 | Status: ACTIVE | COMMUNITY
Start: 2025-07-09 | End: 1900-01-01

## 2025-08-04 NOTE — ED PROCEDURE NOTE - CPROC ED LACERATION CLEANSED1
"SUBJECTIVE:  Petros Denson is a 7 y.o. male here accompanied by mother for allergy injections.     HPI  6 y/o WM presents for allergy injection, no complaints.     Petros has had no recent illness, fever, or wheezing. He has an Epipen today.     Antonio's allergies, medications were updated as appropriate.    OBJECTIVE:  Vital signs  Vitals:    08/04/25 1010   Temp: 97.8 °F (36.6 °C)   TempSrc: Oral        Physical Exam     ASSESSMENT/PLAN:  Petros Blanca" was seen today for immunotherapy.    Diagnoses and all orders for this visit:    Seasonal allergic rhinitis due to pollen  -     Allergy Mix         Pt received injection and was observed x 20 minutes. There was 4 mm reaction noted to left upper arm injection site.     Follow Up:  No follow-ups on file.     "
cleansed/copious irrigation

## (undated) DEVICE — VAGINAL PACKING 2 X 6"

## (undated) DEVICE — DRAPE 3/4 SHEET 52X76"

## (undated) DEVICE — VENODYNE/SCD SLEEVE CALF LARGE

## (undated) DEVICE — WARMING BLANKET LOWER ADULT

## (undated) DEVICE — DRAPE MAGNETIC INSTRUMENT MEDIUM

## (undated) DEVICE — SPONGE END-STRUNG CYLINDRICAL .5X1.5"

## (undated) DEVICE — DRAPE LIGHT HANDLE COVER (GREEN)

## (undated) DEVICE — DRAPE LIGHT HANDLE COVER (BLUE)

## (undated) DEVICE — LABELS BLANK W PEN

## (undated) DEVICE — POSITIONER FOAM HEAD CRADLE (PINK)

## (undated) DEVICE — DRAPE SPLIT SHEET 77" X 120"

## (undated) DEVICE — VENODYNE/SCD SLEEVE CALF MEDIUM

## (undated) DEVICE — SOL IRR POUR H2O 250ML

## (undated) DEVICE — GLV 7 PROTEXIS (WHITE)

## (undated) DEVICE — ELCTR GROUNDING PAD ADULT COVIDIEN

## (undated) DEVICE — FOLEY TRAY 16FR 5CC LTX UMETER CLOSED

## (undated) DEVICE — GOWN TRIMAX LG

## (undated) DEVICE — DRAPE ISOLATION BAG 20X20"

## (undated) DEVICE — GOWN XL

## (undated) DEVICE — SOL IRR POUR NS 0.9% 500ML

## (undated) DEVICE — DRAPE INSTRUMENT POUCH 6.75" X 11"

## (undated) DEVICE — TUBING SUCTION NONCONDUCTIVE 6MM X 12FT

## (undated) DEVICE — DRSG CURITY GAUZE SPONGE 4 X 4" 12-PLY

## (undated) DEVICE — BASIN SET DOUBLE

## (undated) DEVICE — SUCTION YANKAUER NO CONTROL VENT

## (undated) DEVICE — SPECIMEN CONTAINER 100ML

## (undated) DEVICE — DRAPE SURGICAL #1010

## (undated) DEVICE — VAGINAL PACKING 2"

## (undated) DEVICE — GLV 7.5 PROTEXIS (WHITE)

## (undated) DEVICE — GLV 6.5 PROTEXIS (WHITE)

## (undated) DEVICE — LAP PAD 18 X 18"

## (undated) DEVICE — DRAPE MAYO STAND 30"

## (undated) DEVICE — PACK BASIC GOWN

## (undated) DEVICE — POSITIONER FOAM EGG CRATE ULNAR 2PCS (PINK)

## (undated) DEVICE — DRAPE CAMERA ENDOMATE

## (undated) DEVICE — VISITEC 4X4

## (undated) DEVICE — SYR ASEPTO

## (undated) DEVICE — MEDICATION LABELS W MARKER